# Patient Record
Sex: FEMALE | Race: WHITE | Employment: UNEMPLOYED | URBAN - METROPOLITAN AREA
[De-identification: names, ages, dates, MRNs, and addresses within clinical notes are randomized per-mention and may not be internally consistent; named-entity substitution may affect disease eponyms.]

---

## 2017-01-05 ENCOUNTER — OFFICE VISIT (OUTPATIENT)
Dept: INTERNAL MEDICINE CLINIC | Facility: CLINIC | Age: 27
End: 2017-01-05

## 2017-01-05 VITALS
BODY MASS INDEX: 22.02 KG/M2 | HEART RATE: 81 BPM | HEIGHT: 64 IN | OXYGEN SATURATION: 98 % | TEMPERATURE: 98 F | DIASTOLIC BLOOD PRESSURE: 68 MMHG | RESPIRATION RATE: 16 BRPM | WEIGHT: 129 LBS | SYSTOLIC BLOOD PRESSURE: 112 MMHG

## 2017-01-05 DIAGNOSIS — H66.93 BILATERAL OTITIS MEDIA, UNSPECIFIED CHRONICITY, UNSPECIFIED OTITIS MEDIA TYPE: ICD-10-CM

## 2017-01-05 DIAGNOSIS — J32.9 SINUSITIS, UNSPECIFIED CHRONICITY, UNSPECIFIED LOCATION: Primary | ICD-10-CM

## 2017-01-05 PROCEDURE — 99213 OFFICE O/P EST LOW 20 MIN: CPT | Performed by: INTERNAL MEDICINE

## 2017-01-05 RX ORDER — AMOXICILLIN AND CLAVULANATE POTASSIUM 875; 125 MG/1; MG/1
1 TABLET, FILM COATED ORAL 2 TIMES DAILY
Qty: 20 TABLET | Refills: 0 | Status: SHIPPED | OUTPATIENT
Start: 2017-01-05 | End: 2017-02-20

## 2017-01-05 RX ORDER — PSEUDOEPHEDRINE HYDROCHLORIDE 30 MG/1
30 TABLET ORAL EVERY 4 HOURS PRN
COMMUNITY
End: 2017-02-20

## 2017-01-05 RX ORDER — GUAIFENESIN AND PSEUDOEPHEDRINE HCL 1200; 120 MG/1; MG/1
1 TABLET, EXTENDED RELEASE ORAL 2 TIMES DAILY
Qty: 30 TABLET | Refills: 0 | Status: SHIPPED | OUTPATIENT
Start: 2017-01-05 | End: 2017-01-19

## 2017-01-05 NOTE — PROGRESS NOTES
Katherine Senior is a 24year old female  Patient presents with:   Other: Head congestion, Left ear pain, Ears feel clogged, Sinus pressure, Sinus headaches      HPI:   December 21 she developed sinus congestion, used tylenol sinus , got much better  T supple,no adenopathy,no bruits, no TM  LUNGS: clear to auscultation and percussion      ASSESSMENT AND PLAN:   Sinusitis, unspecified chronicity, unspecified location  (primary encounter diagnosis) start augmentin BID  Bilateral otitis media, unspecified c

## 2017-01-21 ENCOUNTER — LAB ENCOUNTER (OUTPATIENT)
Dept: LAB | Age: 27
End: 2017-01-21
Attending: OBSTETRICS & GYNECOLOGY
Payer: COMMERCIAL

## 2017-01-21 DIAGNOSIS — Z01.419 PAP SMEAR, LOW-RISK: Primary | ICD-10-CM

## 2017-01-21 PROCEDURE — 87624 HPV HI-RISK TYP POOLED RSLT: CPT

## 2017-01-21 PROCEDURE — 88175 CYTOPATH C/V AUTO FLUID REDO: CPT

## 2017-01-21 PROCEDURE — 87625 HPV TYPES 16 & 18 ONLY: CPT

## 2017-01-27 LAB — HPV I/H RISK 1 DNA SPEC QL NAA+PROBE: POSITIVE

## 2017-01-29 LAB
HPV16 DNA CVX QL PROBE+SIG AMP: NEGATIVE
HPV18 DNA CVX QL PROBE+SIG AMP: NEGATIVE

## 2017-02-20 ENCOUNTER — OFFICE VISIT (OUTPATIENT)
Dept: OBGYN CLINIC | Facility: CLINIC | Age: 27
End: 2017-02-20

## 2017-02-20 VITALS
TEMPERATURE: 98 F | HEIGHT: 64 IN | HEART RATE: 88 BPM | SYSTOLIC BLOOD PRESSURE: 114 MMHG | DIASTOLIC BLOOD PRESSURE: 60 MMHG | BODY MASS INDEX: 22.53 KG/M2 | WEIGHT: 132 LBS

## 2017-02-20 DIAGNOSIS — R87.810 CERVICAL HIGH RISK HUMAN PAPILLOMAVIRUS (HPV) DNA TEST POSITIVE: Primary | ICD-10-CM

## 2017-02-20 DIAGNOSIS — Z32.02 PREGNANCY EXAMINATION OR TEST, NEGATIVE RESULT: ICD-10-CM

## 2017-02-20 DIAGNOSIS — R87.610 PAPANICOLAOU SMEAR OF CERVIX WITH ATYPICAL SQUAMOUS CELLS OF UNDETERMINED SIGNIFICANCE (ASC-US): ICD-10-CM

## 2017-02-20 LAB — CONTROL LINE PRESENT WITH A CLEAR BACKGROUND (YES/NO): YES YES/NO

## 2017-02-20 PROCEDURE — 88305 TISSUE EXAM BY PATHOLOGIST: CPT | Performed by: OBSTETRICS & GYNECOLOGY

## 2017-02-20 PROCEDURE — 81025 URINE PREGNANCY TEST: CPT | Performed by: OBSTETRICS & GYNECOLOGY

## 2017-02-20 PROCEDURE — 57454 BX/CURETT OF CERVIX W/SCOPE: CPT | Performed by: OBSTETRICS & GYNECOLOGY

## 2017-02-20 NOTE — PROGRESS NOTES
Colpo w/Cx Biopsy and ECC    Pregnancy Results: negative from urine test     Consent signed. Procedure discussed with patient in detail including indication, risk, benefits, alternatives and complications.     Pre-Procedure:  Pre-Meds:    Cervix prepped wi

## 2017-06-07 ENCOUNTER — TELEPHONE (OUTPATIENT)
Dept: INTERNAL MEDICINE CLINIC | Facility: CLINIC | Age: 27
End: 2017-06-07

## 2017-06-07 NOTE — TELEPHONE ENCOUNTER
Patient needs a physical and Tb test for a job starting 7/5/17. She had a physical on 8/19/16 so it has not been a year. LMOM to call [employer] to find out the coverage for physicals (annual vs calendar year). If annual, than schedule form completion appt.

## 2017-06-08 ENCOUNTER — TELEPHONE (OUTPATIENT)
Dept: INTERNAL MEDICINE CLINIC | Facility: CLINIC | Age: 27
End: 2017-06-08

## 2017-06-09 ENCOUNTER — OFFICE VISIT (OUTPATIENT)
Dept: INTERNAL MEDICINE CLINIC | Facility: CLINIC | Age: 27
End: 2017-06-09

## 2017-06-09 VITALS
TEMPERATURE: 98 F | WEIGHT: 123 LBS | SYSTOLIC BLOOD PRESSURE: 90 MMHG | DIASTOLIC BLOOD PRESSURE: 60 MMHG | HEART RATE: 80 BPM | BODY MASS INDEX: 20.74 KG/M2 | RESPIRATION RATE: 16 BRPM | HEIGHT: 64.5 IN

## 2017-06-09 DIAGNOSIS — Z11.1 SCREENING EXAMINATION FOR PULMONARY TUBERCULOSIS: ICD-10-CM

## 2017-06-09 DIAGNOSIS — Z00.00 ANNUAL PHYSICAL EXAM: Primary | ICD-10-CM

## 2017-06-09 PROCEDURE — 99395 PREV VISIT EST AGE 18-39: CPT | Performed by: PHYSICIAN ASSISTANT

## 2017-06-09 PROCEDURE — 86580 TB INTRADERMAL TEST: CPT | Performed by: PHYSICIAN ASSISTANT

## 2017-06-09 NOTE — PROGRESS NOTES
North Houser is a 24year old female who presents for a complete physical exam.   HPI:   North Houser is a 24year old female who presents for a complete physical exam.    pt recently hired as new special  at "[employer]". heartburn, denies change in BM's or bloody stools  : denies vaginal discharge, urinary symptoms  MUSCULOSKELETAL: denies back pain  NEURO: denies headaches or dizziness  PSYCHE: denies depression or anxiety  HEMATOLOGIC: denies hx of anemia  ENDOCRINE: d Fabiano                                                                   Pathologist:           Yuriy Vo MD                                                           Specimens:   A) - Endocervix, Endocervix Curretting

## 2017-06-12 ENCOUNTER — NURSE ONLY (OUTPATIENT)
Dept: INTERNAL MEDICINE CLINIC | Facility: CLINIC | Age: 27
End: 2017-06-12

## 2017-06-12 NOTE — PROGRESS NOTES
Pt name and  verified. Pt here for tb read. Was placed 17 on left forearm within 48-72 hr timeframe. Negative reading, 0 mm. HPI:    Patient ID: China Graham is a 24year old female.     HPI    Review of Systems         Current Outpatient

## 2017-06-12 NOTE — PROGRESS NOTES
Pt name and  verified. Pt here for tb read, was placed 17 on left forearm within 48-72 hr time frame. Negative reading, 0mm. Paperwork completed and signed by Phil Castillo, given to pt. Tb result updated. Copy sent to scan.

## 2017-09-21 ENCOUNTER — OFFICE VISIT (OUTPATIENT)
Dept: INTERNAL MEDICINE CLINIC | Facility: CLINIC | Age: 27
End: 2017-09-21

## 2017-09-21 VITALS
BODY MASS INDEX: 21.08 KG/M2 | OXYGEN SATURATION: 98 % | WEIGHT: 125 LBS | HEIGHT: 64.5 IN | TEMPERATURE: 99 F | HEART RATE: 80 BPM | DIASTOLIC BLOOD PRESSURE: 70 MMHG | SYSTOLIC BLOOD PRESSURE: 104 MMHG

## 2017-09-21 DIAGNOSIS — H92.01 RIGHT EAR PAIN: Primary | ICD-10-CM

## 2017-09-21 DIAGNOSIS — J02.9 SORE THROAT: ICD-10-CM

## 2017-09-21 PROCEDURE — 99213 OFFICE O/P EST LOW 20 MIN: CPT | Performed by: NURSE PRACTITIONER

## 2017-09-21 RX ORDER — AMOXICILLIN AND CLAVULANATE POTASSIUM 875; 125 MG/1; MG/1
1 TABLET, FILM COATED ORAL 2 TIMES DAILY
Qty: 20 TABLET | Refills: 0 | Status: SHIPPED | OUTPATIENT
Start: 2017-09-21 | End: 2017-10-01

## 2017-09-21 NOTE — PATIENT INSTRUCTIONS
Gargle with warm salt water solution 3-5 times daily. Dissolve 1/2 teaspoon salt in half cup of warm tap water. Gargle and spit.      Try a premixed saline nasal spray, available over the counter, such as Ocean Nasal [place], 4 times d

## 2017-09-21 NOTE — PROGRESS NOTES
Patient presents with:  Ear Pain: right ear pain started yesterday       HPI:  Presents with 1 day history of right ear pain and sore throat on right side only. Stated had some sinus congestion and headaches a few days ago but this has resolved.  Denies fev adenopathy. Cardiovascular: Normal rate, regular rhythm. No murmur. Pulmonary/Chest: No respiratory distress. Effort normal. Breath sounds clear bilaterally. No wheezes, rhonchi or rales  Skin: Skin is warm and dry. No rash noted. No erythema.  No pallo

## 2018-01-22 ENCOUNTER — OFFICE VISIT (OUTPATIENT)
Dept: OBGYN CLINIC | Facility: CLINIC | Age: 28
End: 2018-01-22

## 2018-01-22 VITALS
WEIGHT: 129 LBS | HEIGHT: 64 IN | HEART RATE: 96 BPM | BODY MASS INDEX: 22.02 KG/M2 | SYSTOLIC BLOOD PRESSURE: 102 MMHG | DIASTOLIC BLOOD PRESSURE: 60 MMHG

## 2018-01-22 DIAGNOSIS — Z01.419 ENCOUNTER FOR WELL WOMAN EXAM WITH ROUTINE GYNECOLOGICAL EXAM: Primary | ICD-10-CM

## 2018-01-22 PROCEDURE — 99395 PREV VISIT EST AGE 18-39: CPT | Performed by: OBSTETRICS & GYNECOLOGY

## 2018-01-22 PROCEDURE — 87625 HPV TYPES 16 & 18 ONLY: CPT | Performed by: OBSTETRICS & GYNECOLOGY

## 2018-01-22 PROCEDURE — 87491 CHLMYD TRACH DNA AMP PROBE: CPT | Performed by: OBSTETRICS & GYNECOLOGY

## 2018-01-22 PROCEDURE — 87591 N.GONORRHOEAE DNA AMP PROB: CPT | Performed by: OBSTETRICS & GYNECOLOGY

## 2018-01-22 PROCEDURE — 87624 HPV HI-RISK TYP POOLED RSLT: CPT | Performed by: OBSTETRICS & GYNECOLOGY

## 2018-01-22 PROCEDURE — 88175 CYTOPATH C/V AUTO FLUID REDO: CPT | Performed by: OBSTETRICS & GYNECOLOGY

## 2018-01-22 RX ORDER — DESOGESTREL AND ETHINYL ESTRADIOL 0.15-0.03
1 KIT ORAL DAILY
Qty: 1 PACKAGE | Refills: 11 | Status: SHIPPED | OUTPATIENT
Start: 2018-01-22 | End: 2019-01-03

## 2018-01-22 NOTE — PROGRESS NOTES
Katherine Senior is a 34year old female No obstetric history on file. Patient's last menstrual period was 01/07/2018 (exact date). Patient presents with:  Wellness Visit  . patient had abnormal pap in 2015 ASCUS HPV + followed by normal colpo results Package, Rfl: 11  •  Multiple Vitamins-Calcium (TGT DAILY MULTIVITAMIN WOMENS) Oral Tab, Take 1 tablet by mouth daily. , Disp: , Rfl:     ALLERGIES:  No Known Allergies      Review of Systems:  Constitutional:  Denies fatigue, night sweats, hot flashes  Eye Assessment & Plan:  Diagnoses and all orders for this visit:    Encounter for well woman exam with routine gynecological exam  -     THINPREP PAP WITH HPV REFLEX REQUEST B; Future  -     CHLAMYDIA/GONOCOCCUS, AROLDO;  Future    Other orders  -     Desogest

## 2018-01-23 LAB
C TRACH DNA SPEC QL NAA+PROBE: NEGATIVE
N GONORRHOEA DNA SPEC QL NAA+PROBE: NEGATIVE

## 2018-01-25 LAB — HPV I/H RISK 1 DNA SPEC QL NAA+PROBE: POSITIVE

## 2018-01-28 LAB
HPV16 DNA CVX QL PROBE+SIG AMP: NEGATIVE
HPV18 DNA CVX QL PROBE+SIG AMP: NEGATIVE

## 2018-03-30 NOTE — TELEPHONE ENCOUNTER
12 hour chart check/ 2 RN skin check   MIKHAILI patient is coming in for a work physical tomorrow w/ [address], (per patient she called her insurance company and physical can be completed before the year.) Patient needs a TB test, not sure if it is 1 or 2 step.  Patient aware if ONE step she will need to

## 2018-05-23 ENCOUNTER — OFFICE VISIT (OUTPATIENT)
Dept: INTERNAL MEDICINE CLINIC | Facility: CLINIC | Age: 28
End: 2018-05-23

## 2018-05-23 VITALS
BODY MASS INDEX: 21.89 KG/M2 | TEMPERATURE: 98 F | HEIGHT: 64 IN | SYSTOLIC BLOOD PRESSURE: 106 MMHG | HEART RATE: 92 BPM | RESPIRATION RATE: 14 BRPM | WEIGHT: 128.19 LBS | OXYGEN SATURATION: 98 % | DIASTOLIC BLOOD PRESSURE: 58 MMHG

## 2018-05-23 DIAGNOSIS — R07.81 RIB PAIN ON RIGHT SIDE: ICD-10-CM

## 2018-05-23 DIAGNOSIS — J06.9 ACUTE URI: Primary | ICD-10-CM

## 2018-05-23 PROCEDURE — 87880 STREP A ASSAY W/OPTIC: CPT | Performed by: NURSE PRACTITIONER

## 2018-05-23 PROCEDURE — 99213 OFFICE O/P EST LOW 20 MIN: CPT | Performed by: NURSE PRACTITIONER

## 2018-05-23 RX ORDER — AMOXICILLIN AND CLAVULANATE POTASSIUM 875; 125 MG/1; MG/1
1 TABLET, FILM COATED ORAL 2 TIMES DAILY
Qty: 20 TABLET | Refills: 0 | Status: SHIPPED | OUTPATIENT
Start: 2018-05-23 | End: 2018-06-02

## 2018-05-23 NOTE — PROGRESS NOTES
Patient presents with:  Sore Throat  Body ache and/or chills  Nausea  Nasal Congestion  Cough      HPI:  Presents with approx 4 day history of sore throat, sinus congestion, nasal drainage, headaches, cough w/o production, body aches and some chills- state Location: Right arm, Patient Position: Sitting, Cuff Size: adult)   Pulse 92   Temp 98.2 °F (36.8 °C) (Oral)   Resp 14   Ht 64\"   Wt 128 lb 3.2 oz   LMP 04/29/2018 (Exact Date)   SpO2 98%   BMI 22.01 kg/m²   Constitutional: well developed, well nourished, and spit. Use a humidifier in your room at night.      I highly recommend using a saline nasal wash device such as: SinuCleanse Nasal wash squeeze bottle, Neti-Pot, Neilmed nasal wash or similar device 3 times daily (in the morning, after work and befor

## 2018-07-18 ENCOUNTER — TELEPHONE (OUTPATIENT)
Dept: INTERNAL MEDICINE CLINIC | Facility: CLINIC | Age: 28
End: 2018-07-18

## 2018-07-18 NOTE — TELEPHONE ENCOUNTER
The patient called inquiring about getting a prescription for a nasal spray that Dr. Sheila Cifuentes prescribed years ago. The patient could not remember the name, but started to spell fluticasone.  I asked the patient if it was Flonase (as I couldn't locate the nasa

## 2018-07-18 NOTE — TELEPHONE ENCOUNTER
Pt states that she had a nasal spray for her sinuses prescription from Dr Griffin Pump a few years ago, she is having sinus issues now and would like a refill of it. Does not remember what it was. To [place] [address]. Please advise.  Thank you

## 2018-07-27 ENCOUNTER — OFFICE VISIT (OUTPATIENT)
Dept: OBGYN CLINIC | Facility: CLINIC | Age: 28
End: 2018-07-27
Payer: COMMERCIAL

## 2018-07-27 VITALS
RESPIRATION RATE: 16 BRPM | SYSTOLIC BLOOD PRESSURE: 110 MMHG | WEIGHT: 125 LBS | BODY MASS INDEX: 21.34 KG/M2 | HEIGHT: 64 IN | HEART RATE: 88 BPM | DIASTOLIC BLOOD PRESSURE: 62 MMHG

## 2018-07-27 DIAGNOSIS — R87.610 ATYPICAL SQUAMOUS CELLS OF UNDETERMINED SIGNIFICANCE ON CYTOLOGIC SMEAR OF CERVIX (ASC-US): Primary | ICD-10-CM

## 2018-07-27 PROCEDURE — 88175 CYTOPATH C/V AUTO FLUID REDO: CPT | Performed by: OBSTETRICS & GYNECOLOGY

## 2018-07-27 PROCEDURE — 99212 OFFICE O/P EST SF 10 MIN: CPT | Performed by: OBSTETRICS & GYNECOLOGY

## 2018-07-27 NOTE — PROGRESS NOTES
North Houser is a 34year old female  Patient's last menstrual period was 2018 (approximate). Patient presents with:  Pap: 6 month pap.    .no complaints, if pap smear abnormal will proceed with colpo    OBSTETRICS HISTORY:  OB History Estradiol (RECLIPSEN) 0.15-30 MG-MCG Oral Tab, Take 1 tablet by mouth daily. , Disp: 1 Package, Rfl: 11  •  Multiple Vitamins-Calcium (TGT DAILY MULTIVITAMIN WOMENS) Oral Tab, Take 1 tablet by mouth daily. , Disp: , Rfl:     ALLERGIES:  No Known Allergies

## 2018-09-26 ENCOUNTER — TELEPHONE (OUTPATIENT)
Dept: INTERNAL MEDICINE CLINIC | Facility: CLINIC | Age: 28
End: 2018-09-26

## 2018-09-26 ENCOUNTER — HOSPITAL ENCOUNTER (OUTPATIENT)
Age: 28
Discharge: HOME | End: 2018-09-26
Attending: FAMILY MEDICINE
Payer: COMMERCIAL

## 2018-09-26 ENCOUNTER — APPOINTMENT (OUTPATIENT)
Dept: GENERAL RADIOLOGY | Age: 28
End: 2018-09-26
Attending: FAMILY MEDICINE
Payer: COMMERCIAL

## 2018-09-26 ENCOUNTER — APPOINTMENT (OUTPATIENT)
Dept: ULTRASOUND IMAGING | Age: 28
End: 2018-09-26
Attending: FAMILY MEDICINE
Payer: COMMERCIAL

## 2018-09-26 VITALS
OXYGEN SATURATION: 100 % | TEMPERATURE: 98 F | BODY MASS INDEX: 21.34 KG/M2 | HEART RATE: 78 BPM | RESPIRATION RATE: 18 BRPM | HEIGHT: 64 IN | WEIGHT: 125 LBS | SYSTOLIC BLOOD PRESSURE: 127 MMHG | DIASTOLIC BLOOD PRESSURE: 68 MMHG

## 2018-09-26 DIAGNOSIS — S33.5XXA LUMBAR SPRAIN, INITIAL ENCOUNTER: Primary | ICD-10-CM

## 2018-09-26 DIAGNOSIS — K80.20 CALCULUS OF GALLBLADDER WITHOUT CHOLECYSTITIS WITHOUT OBSTRUCTION: ICD-10-CM

## 2018-09-26 DIAGNOSIS — K59.00 CONSTIPATION, UNSPECIFIED CONSTIPATION TYPE: ICD-10-CM

## 2018-09-26 PROCEDURE — 85025 COMPLETE CBC W/AUTO DIFF WBC: CPT | Performed by: FAMILY MEDICINE

## 2018-09-26 PROCEDURE — 99214 OFFICE O/P EST MOD 30 MIN: CPT

## 2018-09-26 PROCEDURE — 80047 BASIC METABLC PNL IONIZED CA: CPT

## 2018-09-26 PROCEDURE — 81025 URINE PREGNANCY TEST: CPT | Performed by: FAMILY MEDICINE

## 2018-09-26 PROCEDURE — 74018 RADEX ABDOMEN 1 VIEW: CPT | Performed by: FAMILY MEDICINE

## 2018-09-26 PROCEDURE — 81002 URINALYSIS NONAUTO W/O SCOPE: CPT | Performed by: FAMILY MEDICINE

## 2018-09-26 PROCEDURE — 36415 COLL VENOUS BLD VENIPUNCTURE: CPT

## 2018-09-26 PROCEDURE — 99204 OFFICE O/P NEW MOD 45 MIN: CPT

## 2018-09-26 PROCEDURE — 83690 ASSAY OF LIPASE: CPT | Performed by: FAMILY MEDICINE

## 2018-09-26 PROCEDURE — 80053 COMPREHEN METABOLIC PANEL: CPT | Performed by: FAMILY MEDICINE

## 2018-09-26 PROCEDURE — 76700 US EXAM ABDOM COMPLETE: CPT | Performed by: FAMILY MEDICINE

## 2018-09-26 RX ORDER — CYCLOBENZAPRINE HCL 10 MG
10 TABLET ORAL 3 TIMES DAILY PRN
Qty: 10 TABLET | Refills: 0 | Status: SHIPPED | OUTPATIENT
Start: 2018-09-26 | End: 2018-10-03

## 2018-09-26 NOTE — TELEPHONE ENCOUNTER
Patient called to schedule appointment for back pain stated she believe it may be muscle or gallbladder. Appointment scheduled tmrw with . Please advise if patient needs to be seen sooner. Thank you!

## 2018-09-26 NOTE — TELEPHONE ENCOUNTER
Spoke to patient, advised to go to ED, cancelled appt with Dr. Kae Orozco tomorrow, will schedule a follow up appointment when necessary, patient expressed understanding.

## 2018-09-26 NOTE — TELEPHONE ENCOUNTER
Patient is calling in with back pain since Thursday, patient states it has gotten progressively worse all through her back, mostly mid back. Patient has history of  Gallstones.  Patient states over the weekend her right side was sore by gallbladder, \"feels

## 2018-09-26 NOTE — ED PROVIDER NOTES
Patient Seen in: [facility]    History   Patient presents with:  Back Pain (musculoskeletal)    Stated Complaint: back pain 6 days,gall stones    HPI    34year old female presents for low back pain and right upper abdominal nargis 78   Temp 98.4 °F (36.9 °C) (Temporal)   Resp 18   Ht 162.6 cm (5' 4\")   Wt 56.7 kg   LMP 09/16/2018   SpO2 100%   BMI 21.46 kg/m²         Physical Exam   Constitutional: She is oriented to person, place, and time.  She appears well-developed and well-nour Patient has no RUQ pain currently. Advised to increased PO fluids, high fiber diet and take Miralax as instructed. Advised to follow up with surgery if recurrent symptoms. Flexeril as prescribed. Cautioned about side effects.  Follow up with PCP if not bett

## 2018-09-26 NOTE — ED INITIAL ASSESSMENT (HPI)
C/O midback pain that started over the weekend that radiates to her upper back. Also c/o right lower back pain w/ numbness to her right arm and leg, sts that has since subsided. Has tried Tylenol E.S. and Aleve w/o much relief.  Sts that today she has pain

## 2018-09-27 ENCOUNTER — TELEPHONE (OUTPATIENT)
Dept: INTERNAL MEDICINE CLINIC | Facility: CLINIC | Age: 28
End: 2018-09-27

## 2018-09-27 NOTE — TELEPHONE ENCOUNTER
Patient was recently see at [facility] 09-26-18 calling to schedule UC follow up appointment. Please advise. Thank you!

## 2018-09-27 NOTE — TELEPHONE ENCOUNTER
Patient seen in UC yesterday 9/26/19 for back pain, right sided abdominal pain, history of gallstones. Patient dx musculoskeletal pain, prescribed muscle relaxer.  Patient states today she feels better, taking muscle relaxers, and alternating tylenol, ibupr

## 2018-10-08 ENCOUNTER — LAB ENCOUNTER (OUTPATIENT)
Dept: LAB | Age: 28
End: 2018-10-08
Attending: INTERNAL MEDICINE
Payer: COMMERCIAL

## 2018-10-08 ENCOUNTER — HOSPITAL ENCOUNTER (OUTPATIENT)
Dept: GENERAL RADIOLOGY | Age: 28
Discharge: HOME | End: 2018-10-08
Attending: INTERNAL MEDICINE
Payer: COMMERCIAL

## 2018-10-08 ENCOUNTER — OFFICE VISIT (OUTPATIENT)
Dept: INTERNAL MEDICINE CLINIC | Facility: CLINIC | Age: 28
End: 2018-10-08
Payer: COMMERCIAL

## 2018-10-08 VITALS
SYSTOLIC BLOOD PRESSURE: 128 MMHG | BODY MASS INDEX: 21.34 KG/M2 | RESPIRATION RATE: 14 BRPM | WEIGHT: 125 LBS | HEART RATE: 107 BPM | OXYGEN SATURATION: 98 % | DIASTOLIC BLOOD PRESSURE: 80 MMHG | HEIGHT: 64 IN

## 2018-10-08 DIAGNOSIS — K80.20 CALCULUS OF GALLBLADDER WITHOUT CHOLECYSTITIS WITHOUT OBSTRUCTION: ICD-10-CM

## 2018-10-08 DIAGNOSIS — M54.50 ACUTE BILATERAL LOW BACK PAIN WITHOUT SCIATICA: ICD-10-CM

## 2018-10-08 DIAGNOSIS — M54.6 ACUTE BILATERAL THORACIC BACK PAIN: ICD-10-CM

## 2018-10-08 DIAGNOSIS — M54.6 ACUTE BILATERAL THORACIC BACK PAIN: Primary | ICD-10-CM

## 2018-10-08 PROCEDURE — 87086 URINE CULTURE/COLONY COUNT: CPT | Performed by: INTERNAL MEDICINE

## 2018-10-08 PROCEDURE — 99214 OFFICE O/P EST MOD 30 MIN: CPT | Performed by: INTERNAL MEDICINE

## 2018-10-08 PROCEDURE — 71046 X-RAY EXAM CHEST 2 VIEWS: CPT | Performed by: INTERNAL MEDICINE

## 2018-10-08 PROCEDURE — 81003 URINALYSIS AUTO W/O SCOPE: CPT | Performed by: INTERNAL MEDICINE

## 2018-10-08 NOTE — PROGRESS NOTES
Zahraa Bueno is a 34year old female  Patient presents with:  Urgent Care F/u: Back Pain - Middle Area      HPI:   2 weeks LBP  Returning from work, developed upper neck pain going to shoulder blades then to middle back thennext day whole middle b GI: good BS's,no masses, HSM or tenderness, negative alicea  EXTREMITIES: no cyanosis, clubbing or edema  SPINE nontender, normal curvature,     Results for orders placed or performed during the hospital encounter of 69/46/80   COMP METABOLIC PANEL (14) 1.005, 1.010, 1.015, 1.020, 1.025, 1.030, >=1.030, <=1.005    Blood, Urine Trace-Intact (A) Negative    PH, Urine 7.5 5.0 - 8.0    Protein urine Negative Negative mg/dL    Urobilinogen urine 0.2 0.2 - 2.0 mg/dL    Nitrite Urine Negative Negative    Leukocy

## 2018-10-10 ENCOUNTER — TELEPHONE (OUTPATIENT)
Dept: INTERNAL MEDICINE CLINIC | Facility: CLINIC | Age: 28
End: 2018-10-10

## 2018-10-10 NOTE — TELEPHONE ENCOUNTER
Patient calling stated she recently had lab work done calling to go over the results. Please advise. Thank you!

## 2018-10-10 NOTE — TELEPHONE ENCOUNTER
Spoke with pt, she is looking for results from UC on 9/26/18. She was seen in office for UC f/u on 10/8/18 but states some labs were still outstanding. Wasn't sure if there was any change to treatment plan or anything worrisome.

## 2018-10-11 RX ORDER — CYCLOBENZAPRINE HCL 10 MG
10 TABLET ORAL NIGHTLY
Qty: 20 TABLET | Refills: 0 | Status: SHIPPED | OUTPATIENT
Start: 2018-10-11 | End: 2018-10-31

## 2018-10-11 NOTE — TELEPHONE ENCOUNTER
I am note sure what the patient is looking for. Does she need results? Does she want recommendations? LM for patient to call back to clarify.

## 2018-10-11 NOTE — TELEPHONE ENCOUNTER
Patient seen in  recently for back pain, rule out UTI. Spoke with patient about lab results, patient has a follow up with Dr. Phebe Brittle for back pain on 11/9/18, will be calling Dr. Herbert Maciel for an appointment.  In the meantime patient stating she would like a

## 2018-11-05 ENCOUNTER — OFFICE VISIT (OUTPATIENT)
Dept: SURGERY | Facility: CLINIC | Age: 28
End: 2018-11-05
Payer: COMMERCIAL

## 2018-11-05 VITALS
HEIGHT: 64 IN | TEMPERATURE: 98 F | DIASTOLIC BLOOD PRESSURE: 80 MMHG | BODY MASS INDEX: 21.34 KG/M2 | SYSTOLIC BLOOD PRESSURE: 120 MMHG | WEIGHT: 125 LBS | HEART RATE: 77 BPM

## 2018-11-05 DIAGNOSIS — K81.9 CHOLECYSTITIS: Primary | ICD-10-CM

## 2018-11-05 DIAGNOSIS — K80.20 CALCULUS OF GALLBLADDER WITHOUT CHOLECYSTITIS WITHOUT OBSTRUCTION: ICD-10-CM

## 2018-11-05 PROCEDURE — 99243 OFF/OP CNSLTJ NEW/EST LOW 30: CPT | Performed by: COLON & RECTAL SURGERY

## 2018-11-06 NOTE — H&P
New Patient Visit Note       Active Problems      1. Cholecystitis    2.  Calculus of gallbladder without cholecystitis without obstruction        Chief Complaint   Patient presents with:  Gallbladder: np ref by Dr. Angel Gonzalez for gallbladder consult, pt manuel December of this year.         Please provide approximate dates for:   Date   First symptoms or \"attack\" 9/20/18   Most recent \"attack\" n/a     Please answer the following:   Number   How many attacks have occurred? n/a   How many in the last month? n/a half of our visit was spent in counseling the patient on the above listed diagnoses and treatment options. Allergies  Tara has No Known Allergies.     Past Medical / Surgical / Social / Family History    The past medical and past surgical history hav Stress Concern: Not Asked        Weight Concern: Not Asked        Special Diet: Not Asked        Back Care: Not Asked        Exercise: Yes          2 days per week         Bike Helmet: Not Asked        Seat Belt: Yes        Self-Exams: Not Asked    Social diagnosis)  Calculus of gallbladder without cholecystitis without obstruction      Plan   This patient presented the referral of her primary care physician, Dr. Adair salazar.     The patient states that she had her first gallbladder attack in approximately bowel sounds present. He does have a bellybutton piercing in place. There are no other incisions on the abdomen. There are no hernias present. At today's office visit I had an extended conversation with the patient and her fiancé.   The patient did ha

## 2018-11-09 ENCOUNTER — OFFICE VISIT (OUTPATIENT)
Dept: INTERNAL MEDICINE CLINIC | Facility: CLINIC | Age: 28
End: 2018-11-09
Payer: COMMERCIAL

## 2018-11-09 VITALS
RESPIRATION RATE: 14 BRPM | DIASTOLIC BLOOD PRESSURE: 66 MMHG | WEIGHT: 126 LBS | OXYGEN SATURATION: 98 % | HEART RATE: 95 BPM | BODY MASS INDEX: 21.51 KG/M2 | SYSTOLIC BLOOD PRESSURE: 120 MMHG | TEMPERATURE: 98 F | HEIGHT: 64 IN

## 2018-11-09 DIAGNOSIS — R20.0 NUMBNESS AND TINGLING OF RIGHT ARM: ICD-10-CM

## 2018-11-09 DIAGNOSIS — R20.2 RIGHT LEG PARESTHESIAS: ICD-10-CM

## 2018-11-09 DIAGNOSIS — M54.6 ACUTE BILATERAL THORACIC BACK PAIN: ICD-10-CM

## 2018-11-09 DIAGNOSIS — K80.20 CALCULUS OF GALLBLADDER WITHOUT CHOLECYSTITIS WITHOUT OBSTRUCTION: Primary | ICD-10-CM

## 2018-11-09 DIAGNOSIS — R20.2 NUMBNESS AND TINGLING OF RIGHT ARM: ICD-10-CM

## 2018-11-09 PROCEDURE — 99214 OFFICE O/P EST MOD 30 MIN: CPT | Performed by: INTERNAL MEDICINE

## 2018-11-09 NOTE — PROGRESS NOTES
Zahraa Bueno is a 34year old female  Patient presents with: Follow - Up: for back pain, was referred to surgeon      HPI:   Had bilateral paraspinal thoracic pain for a month and it just started subsiding last week.  She also had persistent RUE a Cholelithiasis        REVIEW OF SYSTEMS:   GENERAL HEALTH: feels well otherwise      EXAM:   /66 (BP Location: Left arm, Patient Position: Sitting, Cuff Size: adult)   Pulse 95   Temp 98 °F (36.7 °C) (Oral)   Resp 14   Ht 64\"   Wt 126 lb   LMP 10/0 understanding of these issues and agrees to the plan.

## 2018-11-09 NOTE — PATIENT INSTRUCTIONS
This patient presented the referral of her primary care physician, Dr. Drew Sheldon. The patient states that she had her first gallbladder attack in approximately 2003. She had severe epigastric abdominal pain coupled with nausea and vomiting.   She presented on the abdomen. There are no hernias present. At today's office visit I had an extended conversation with the patient and her fiancé.   The patient did have at least 2 episodes of cholelithiasis which prompted further evaluation and treatment at an urge

## 2018-11-10 PROBLEM — K81.9 CHOLECYSTITIS: Status: ACTIVE | Noted: 2018-11-10

## 2018-11-13 ENCOUNTER — TELEPHONE (OUTPATIENT)
Dept: SURGERY | Facility: CLINIC | Age: 28
End: 2018-11-13

## 2018-11-13 DIAGNOSIS — K81.9 CHOLECYSTITIS: Primary | ICD-10-CM

## 2018-11-14 ENCOUNTER — TELEPHONE (OUTPATIENT)
Dept: SURGERY | Facility: CLINIC | Age: 28
End: 2018-11-14

## 2018-11-14 DIAGNOSIS — K81.9 CHOLECYSTITIS: Primary | ICD-10-CM

## 2019-01-02 ENCOUNTER — TELEPHONE (OUTPATIENT)
Dept: SURGERY | Facility: CLINIC | Age: 29
End: 2019-01-02

## 2019-01-03 RX ORDER — DESOGESTREL AND ETHINYL ESTRADIOL 0.15-0.03
KIT ORAL
Qty: 28 TABLET | Refills: 0 | Status: SHIPPED | OUTPATIENT
Start: 2019-01-03 | End: 2019-01-25

## 2019-01-07 ENCOUNTER — TELEPHONE (OUTPATIENT)
Dept: SURGERY | Facility: CLINIC | Age: 29
End: 2019-01-07

## 2019-01-07 DIAGNOSIS — K80.10 CALCULUS OF GALLBLADDER WITH CHRONIC CHOLECYSTITIS WITHOUT OBSTRUCTION: Primary | ICD-10-CM

## 2019-01-23 ENCOUNTER — TELEPHONE (OUTPATIENT)
Dept: SURGERY | Facility: CLINIC | Age: 29
End: 2019-01-23

## 2019-01-25 ENCOUNTER — OFFICE VISIT (OUTPATIENT)
Dept: OBGYN CLINIC | Facility: CLINIC | Age: 29
End: 2019-01-25
Payer: COMMERCIAL

## 2019-01-25 VITALS
RESPIRATION RATE: 16 BRPM | SYSTOLIC BLOOD PRESSURE: 118 MMHG | DIASTOLIC BLOOD PRESSURE: 84 MMHG | HEART RATE: 81 BPM | BODY MASS INDEX: 21.68 KG/M2 | WEIGHT: 127 LBS | HEIGHT: 64 IN

## 2019-01-25 DIAGNOSIS — Z01.419 ENCOUNTER FOR WELL WOMAN EXAM WITH ROUTINE GYNECOLOGICAL EXAM: Primary | ICD-10-CM

## 2019-01-25 DIAGNOSIS — Z12.4 CERVICAL CANCER SCREENING: ICD-10-CM

## 2019-01-25 PROCEDURE — 99395 PREV VISIT EST AGE 18-39: CPT | Performed by: OBSTETRICS & GYNECOLOGY

## 2019-01-25 PROCEDURE — 88175 CYTOPATH C/V AUTO FLUID REDO: CPT | Performed by: OBSTETRICS & GYNECOLOGY

## 2019-01-25 PROCEDURE — 87491 CHLMYD TRACH DNA AMP PROBE: CPT | Performed by: OBSTETRICS & GYNECOLOGY

## 2019-01-25 PROCEDURE — 87591 N.GONORRHOEAE DNA AMP PROB: CPT | Performed by: OBSTETRICS & GYNECOLOGY

## 2019-01-25 RX ORDER — DESOGESTREL AND ETHINYL ESTRADIOL 0.15-0.03
1 KIT ORAL
Qty: 28 TABLET | Refills: 11 | Status: SHIPPED | OUTPATIENT
Start: 2019-01-25 | End: 2019-07-05

## 2019-01-25 NOTE — PROGRESS NOTES
Kedar Espinoza is a 34year old female  Patient's last menstrual period was 2019 (exact date). Patient presents with:  Wellness Visit  .   Patient recently got , considering stopping OCP, desires STD testing  OBSTETRICS HISTORY:  OB Not Asked        Blood Transfusions: Not Asked        Caffeine Concern: No        Occupational Exposure: Not Asked        Hobby Hazards: Not Asked        Sleep Concern: Not Asked        Stress Concern: Not Asked        Weight Concern: Not Asked        Spec Constitutional: well developed, well nourished  Head/Face: normocephalic  Neck/Thyroid: thyroid symmetric, no thyromegaly, no nodules, no adenopathy  Lymphatic:no abnormal supraclavicular or axillary adenopathy is noted  Breast: normal without palpable m

## 2019-01-27 LAB
C TRACH DNA SPEC QL NAA+PROBE: NEGATIVE
N GONORRHOEA DNA SPEC QL NAA+PROBE: NEGATIVE

## 2019-01-30 ENCOUNTER — TELEPHONE (OUTPATIENT)
Dept: SURGERY | Facility: CLINIC | Age: 29
End: 2019-01-30

## 2019-01-30 DIAGNOSIS — K80.18 CALCULUS OF GALLBLADDER WITH OTHER CHOLECYSTITIS WITHOUT OBSTRUCTION: Primary | ICD-10-CM

## 2019-01-31 RX ORDER — HEPARIN SODIUM 5000 [USP'U]/ML
5000 INJECTION, SOLUTION INTRAVENOUS; SUBCUTANEOUS ONCE
Status: CANCELLED | OUTPATIENT
Start: 2019-01-31 | End: 2019-01-31

## 2019-02-05 ENCOUNTER — OFFICE VISIT (OUTPATIENT)
Dept: SURGERY | Facility: CLINIC | Age: 29
End: 2019-02-05
Payer: COMMERCIAL

## 2019-02-05 ENCOUNTER — APPOINTMENT (OUTPATIENT)
Dept: LAB | Facility: HOSPITAL | Age: 29
End: 2019-02-05
Payer: COMMERCIAL

## 2019-02-05 VITALS
RESPIRATION RATE: 18 BRPM | HEART RATE: 74 BPM | TEMPERATURE: 98 F | SYSTOLIC BLOOD PRESSURE: 124 MMHG | WEIGHT: 127 LBS | DIASTOLIC BLOOD PRESSURE: 84 MMHG | BODY MASS INDEX: 21.42 KG/M2 | HEIGHT: 64.5 IN

## 2019-02-05 DIAGNOSIS — K80.20 CALCULUS OF GALLBLADDER WITHOUT CHOLECYSTITIS WITHOUT OBSTRUCTION: Primary | ICD-10-CM

## 2019-02-05 DIAGNOSIS — K81.9 CHOLECYSTITIS: ICD-10-CM

## 2019-02-05 LAB
ALBUMIN SERPL-MCNC: 4.3 G/DL (ref 3.1–4.5)
ALBUMIN/GLOB SERPL: 1.2 {RATIO} (ref 1–2)
ALP LIVER SERPL-CCNC: 47 U/L (ref 37–98)
ALT SERPL-CCNC: 19 U/L (ref 14–54)
ANION GAP SERPL CALC-SCNC: 8 MMOL/L (ref 0–18)
AST SERPL-CCNC: 15 U/L (ref 15–41)
BILIRUB SERPL-MCNC: 0.4 MG/DL (ref 0.1–2)
BUN BLD-MCNC: 10 MG/DL (ref 8–20)
BUN/CREAT SERPL: 18.2 (ref 10–20)
CALCIUM BLD-MCNC: 9 MG/DL (ref 8.3–10.3)
CHLORIDE SERPL-SCNC: 105 MMOL/L (ref 101–111)
CO2 SERPL-SCNC: 26 MMOL/L (ref 22–32)
CREAT BLD-MCNC: 0.55 MG/DL (ref 0.55–1.02)
GLOBULIN PLAS-MCNC: 3.6 G/DL (ref 2.8–4.4)
GLUCOSE BLD-MCNC: 87 MG/DL (ref 70–99)
M PROTEIN MFR SERPL ELPH: 7.9 G/DL (ref 6.4–8.2)
OSMOLALITY SERPL CALC.SUM OF ELEC: 286 MOSM/KG (ref 275–295)
POTASSIUM SERPL-SCNC: 3.9 MMOL/L (ref 3.6–5.1)
SODIUM SERPL-SCNC: 139 MMOL/L (ref 136–144)

## 2019-02-05 PROCEDURE — 36415 COLL VENOUS BLD VENIPUNCTURE: CPT

## 2019-02-05 PROCEDURE — 99213 OFFICE O/P EST LOW 20 MIN: CPT | Performed by: SURGERY

## 2019-02-05 PROCEDURE — 80053 COMPREHEN METABOLIC PANEL: CPT

## 2019-02-05 RX ORDER — MV-MN/C/GLUTAMIN/LYSIN/HERB124 250-1.25MG
TABLET,CHEWABLE ORAL
COMMUNITY
End: 2019-08-01

## 2019-02-05 NOTE — PROGRESS NOTES
Follow Up Visit Note       Active Problems      1.  Calculus of gallbladder without cholecystitis without obstruction          Chief Complaint   Patient presents with:  Pre-Op: Pre-op Lap Ladonna scheduled 2/13 (DJP PT)        History of Present Illness    Pt been reviewed by me today.     Family History   Problem Relation Age of Onset   • Cancer Mother 36        Breast    • Hypertension Father    • Other (Kidney disease) Father      Social History    Socioeconomic History      Marital status:       Spous Gastrointestinal: Negative for abdominal distention, abdominal pain, anal bleeding, blood in stool, constipation, diarrhea, nausea and vomiting. Genitourinary: Negative for difficulty urinating, dysuria, frequency and urgency.    Musculoskeletal: Thressa Pheasant explained to the patient and the family. No orders of the defined types were placed in this encounter. Imaging & Referrals   None    Follow Up  No Follow-up on file.     Georgi Benavides MD

## 2019-02-13 ENCOUNTER — APPOINTMENT (OUTPATIENT)
Dept: GENERAL RADIOLOGY | Facility: HOSPITAL | Age: 29
End: 2019-02-13
Attending: SURGERY
Payer: COMMERCIAL

## 2019-02-13 ENCOUNTER — ANESTHESIA (OUTPATIENT)
Dept: SURGERY | Facility: HOSPITAL | Age: 29
End: 2019-02-13

## 2019-02-13 ENCOUNTER — HOSPITAL ENCOUNTER (OUTPATIENT)
Facility: HOSPITAL | Age: 29
Setting detail: HOSPITAL OUTPATIENT SURGERY
Discharge: HOME | End: 2019-02-13
Attending: SURGERY | Admitting: SURGERY
Payer: COMMERCIAL

## 2019-02-13 ENCOUNTER — ANESTHESIA EVENT (OUTPATIENT)
Dept: SURGERY | Facility: HOSPITAL | Age: 29
End: 2019-02-13

## 2019-02-13 VITALS
HEIGHT: 64 IN | SYSTOLIC BLOOD PRESSURE: 120 MMHG | WEIGHT: 123.44 LBS | DIASTOLIC BLOOD PRESSURE: 70 MMHG | BODY MASS INDEX: 21.07 KG/M2 | HEART RATE: 84 BPM | RESPIRATION RATE: 18 BRPM | TEMPERATURE: 99 F | OXYGEN SATURATION: 100 %

## 2019-02-13 DIAGNOSIS — K81.9 CHOLECYSTITIS: Primary | ICD-10-CM

## 2019-02-13 LAB
POCT LOT NUMBER: NORMAL
POCT URINE PREGNANCY: NEGATIVE

## 2019-02-13 PROCEDURE — 47563 LAPARO CHOLECYSTECTOMY/GRAPH: CPT | Performed by: SURGERY

## 2019-02-13 PROCEDURE — 74300 X-RAY BILE DUCTS/PANCREAS: CPT | Performed by: SURGERY

## 2019-02-13 PROCEDURE — BF031ZZ PLAIN RADIOGRAPHY OF GALLBLADDER AND BILE DUCTS USING LOW OSMOLAR CONTRAST: ICD-10-PCS | Performed by: SURGERY

## 2019-02-13 PROCEDURE — 0FT44ZZ RESECTION OF GALLBLADDER, PERCUTANEOUS ENDOSCOPIC APPROACH: ICD-10-PCS | Performed by: SURGERY

## 2019-02-13 RX ORDER — SODIUM CHLORIDE, SODIUM LACTATE, POTASSIUM CHLORIDE, CALCIUM CHLORIDE 600; 310; 30; 20 MG/100ML; MG/100ML; MG/100ML; MG/100ML
INJECTION, SOLUTION INTRAVENOUS CONTINUOUS
Status: DISCONTINUED | OUTPATIENT
Start: 2019-02-13 | End: 2019-02-13

## 2019-02-13 RX ORDER — HYDROCODONE BITARTRATE AND ACETAMINOPHEN 5; 325 MG/1; MG/1
1 TABLET ORAL EVERY 4 HOURS PRN
Qty: 20 TABLET | Refills: 0 | Status: SHIPPED | OUTPATIENT
Start: 2019-02-13 | End: 2019-02-23

## 2019-02-13 RX ORDER — BUPIVACAINE HYDROCHLORIDE AND EPINEPHRINE 5; 5 MG/ML; UG/ML
INJECTION, SOLUTION EPIDURAL; INTRACAUDAL; PERINEURAL AS NEEDED
Status: DISCONTINUED | OUTPATIENT
Start: 2019-02-13 | End: 2019-02-13 | Stop reason: HOSPADM

## 2019-02-13 RX ORDER — HYDROMORPHONE HYDROCHLORIDE 1 MG/ML
INJECTION, SOLUTION INTRAMUSCULAR; INTRAVENOUS; SUBCUTANEOUS
Status: COMPLETED
Start: 2019-02-13 | End: 2019-02-13

## 2019-02-13 RX ORDER — SCOLOPAMINE TRANSDERMAL SYSTEM 1 MG/1
1 PATCH, EXTENDED RELEASE TRANSDERMAL
Status: DISCONTINUED | OUTPATIENT
Start: 2019-02-13 | End: 2019-02-13

## 2019-02-13 RX ORDER — HYDROCODONE BITARTRATE AND ACETAMINOPHEN 5; 325 MG/1; MG/1
1 TABLET ORAL AS NEEDED
Status: COMPLETED | OUTPATIENT
Start: 2019-02-13 | End: 2019-02-13

## 2019-02-13 RX ORDER — ACETAMINOPHEN 500 MG
1000 TABLET ORAL ONCE
Status: DISCONTINUED | OUTPATIENT
Start: 2019-02-13 | End: 2019-02-13 | Stop reason: HOSPADM

## 2019-02-13 RX ORDER — SCOLOPAMINE TRANSDERMAL SYSTEM 1 MG/1
PATCH, EXTENDED RELEASE TRANSDERMAL
Status: DISCONTINUED
Start: 2019-02-13 | End: 2019-02-13

## 2019-02-13 RX ORDER — HYDROMORPHONE HYDROCHLORIDE 1 MG/ML
0.4 INJECTION, SOLUTION INTRAMUSCULAR; INTRAVENOUS; SUBCUTANEOUS EVERY 5 MIN PRN
Status: DISCONTINUED | OUTPATIENT
Start: 2019-02-13 | End: 2019-02-13

## 2019-02-13 RX ORDER — METOCLOPRAMIDE HYDROCHLORIDE 5 MG/ML
10 INJECTION INTRAMUSCULAR; INTRAVENOUS AS NEEDED
Status: DISCONTINUED | OUTPATIENT
Start: 2019-02-13 | End: 2019-02-13

## 2019-02-13 RX ORDER — DIPHENHYDRAMINE HYDROCHLORIDE 50 MG/ML
12.5 INJECTION INTRAMUSCULAR; INTRAVENOUS AS NEEDED
Status: DISCONTINUED | OUTPATIENT
Start: 2019-02-13 | End: 2019-02-13

## 2019-02-13 RX ORDER — HYDROCODONE BITARTRATE AND ACETAMINOPHEN 5; 325 MG/1; MG/1
2 TABLET ORAL AS NEEDED
Status: COMPLETED | OUTPATIENT
Start: 2019-02-13 | End: 2019-02-13

## 2019-02-13 RX ORDER — HEPARIN SODIUM 5000 [USP'U]/ML
5000 INJECTION, SOLUTION INTRAVENOUS; SUBCUTANEOUS ONCE
Status: COMPLETED | OUTPATIENT
Start: 2019-02-13 | End: 2019-02-13

## 2019-02-13 RX ORDER — NALOXONE HYDROCHLORIDE 0.4 MG/ML
80 INJECTION, SOLUTION INTRAMUSCULAR; INTRAVENOUS; SUBCUTANEOUS AS NEEDED
Status: DISCONTINUED | OUTPATIENT
Start: 2019-02-13 | End: 2019-02-13

## 2019-02-13 RX ORDER — ONDANSETRON 2 MG/ML
4 INJECTION INTRAMUSCULAR; INTRAVENOUS AS NEEDED
Status: DISCONTINUED | OUTPATIENT
Start: 2019-02-13 | End: 2019-02-13

## 2019-02-13 RX ORDER — LABETALOL HYDROCHLORIDE 5 MG/ML
5 INJECTION, SOLUTION INTRAVENOUS EVERY 5 MIN PRN
Status: DISCONTINUED | OUTPATIENT
Start: 2019-02-13 | End: 2019-02-13

## 2019-02-13 NOTE — H&P
History of Present Illness     Pt here to discuss her scheduled lap cj on 2/13/19. Pt avoiding greasy/fatty foods. US showed gallstones.     FINDINGS:    LIVER:  Normal size and echogenicity. No significant masses.   BILIARY:  1.4 cm gallstone.  No wal History    Socioeconomic History      Marital status:       Spouse name: Not on file      Number of children: Not on file      Years of education: Not on file      Highest education level: Not on file    Tobacco Use      Smoking status: Never Smoker urinating, dysuria, frequency and urgency. Musculoskeletal: Negative for arthralgias and myalgias. Skin: Negative for color change and rash. Neurological: Negative for tremors, syncope and weakness. Hematological: Negative for adenopathy.  Does not of the patient achieving goals; and potential problems that might occur during recuperation.   I discussed reasonable alternatives to the procedure, including risks, benefits and side effects related to the alternatives and risks related to not receiving th

## 2019-02-13 NOTE — ANESTHESIA PREPROCEDURE EVALUATION
PRE-OP EVALUATION    Patient Name: Phuong Spaulding    Pre-op Diagnosis: Cholecystitis [K81.9]    Procedure(s):  LAPAROSCOPIC CHOLECYSTECTOMY WITH CHOLANGIOGRAM    Surgeon(s) and Role:     Conrado Ford MD - Primary    Pre-op vitals reviewed.   Temp: mouth once daily. Disp: 28 tablet Rfl: 11   Cholecalciferol (VITAMIN D3 OR) Take 1 capsule by mouth daily. Disp:  Rfl:    Omega-3 Fatty Acids (FISH OIL CONCENTRATE OR) Take 2 capsules by mouth daily.    Disp:  Rfl:    Ascorbic Acid (VITAMIN C OR) Take 1 t bilaterally. (-) wheezes       Other findings            ASA: 1   Plan: general  NPO status verified and patient meets guidelines. Patient has not taken beta blockers in last 24 hours.         Plan/risks discussed with: patient                Presen

## 2019-02-13 NOTE — OPERATIVE REPORT
PREOPERATIVE DIAGNOSIS: Cholelithiasis. POSTOPERATIVE DIAGNOSIS: Cholelithiasis.      PROCEDURE PERFORMED: Laparoscopic cholecystectomy with intraoperative cholangiogram.     SURGEON: Brittney Vazquez MD       ASSISTANT: SAMMY Jimenez into the duodenum, common duct, and hepatic radicles. Then, 2 clips were then placed on the proximal aspect of the duct and the duct was transected with scissors. In a similar fashion, the cystic artery was identified, clipped and divided.  The gallbladder

## 2019-02-13 NOTE — ANESTHESIA POSTPROCEDURE EVALUATION
Aileen Grant 468 Patient Status:  Hospital Outpatient Surgery   Age/Gender 34year old female MRN [id number]   [facility] Attending Papa Miles MD   [facility] Day # 0 PCP Andrew Arvizu MD       Anesthesia

## 2019-02-22 ENCOUNTER — OFFICE VISIT (OUTPATIENT)
Dept: SURGERY | Facility: CLINIC | Age: 29
End: 2019-02-22

## 2019-02-22 VITALS — TEMPERATURE: 98 F | DIASTOLIC BLOOD PRESSURE: 79 MMHG | HEART RATE: 106 BPM | SYSTOLIC BLOOD PRESSURE: 125 MMHG

## 2019-02-22 DIAGNOSIS — K80.20 CALCULUS OF GALLBLADDER WITHOUT CHOLECYSTITIS WITHOUT OBSTRUCTION: Primary | ICD-10-CM

## 2019-02-22 PROCEDURE — 99024 POSTOP FOLLOW-UP VISIT: CPT | Performed by: SURGERY

## 2019-02-22 NOTE — PROGRESS NOTES
Post Operative Visit Note       Active Problems  No diagnosis found. Chief Complaint   Patient presents with:  Post-Op: p/o lap cj on 2/13         History of Present Illness         Allergies  Luz Porch is allergic to dairy products and latex.     Past Rfl: 0  •  Multiple Vitamins-Minerals ([facility] IMMUNITY SUPPORT) Oral Chew Tab, Chew by mouth., Disp: , Rfl:   •  Homeopathic Products (LIVER SUPPORT ), Place under the tongue., Disp: , Rfl:   •  Desogestrel-Ethinyl Estradiol (RECLIPSEN) 0.15-30 MG Up  No Follow-up on file.     Felicitas Patel MD

## 2019-02-22 NOTE — PROGRESS NOTES
Post Operative Visit Note       Active Problems  1.  Calculus of gallbladder without cholecystitis without obstruction         Chief Complaint   Patient presents with:  Post-Op: p/o lap cj on 2/13 -discomfort with movement in the incision area, random al Smoker      Smokeless tobacco: Never Used    Substance and Sexual Activity      Alcohol use:  Yes        Alcohol/week: 0.0 oz        Comment: Rarely      Drug use: No      Sexual activity: Yes        Birth control/protection: OCP    Other Topics      Concer for color change and rash. Neurological: Negative for tremors, syncope and weakness. Hematological: Negative for adenopathy. Does not bruise/bleed easily. Psychiatric/Behavioral: Negative for behavioral problems and sleep disturbance.        Physical

## 2019-03-01 ENCOUNTER — OFFICE VISIT (OUTPATIENT)
Dept: SURGERY | Facility: CLINIC | Age: 29
End: 2019-03-01

## 2019-03-01 VITALS
HEIGHT: 64.5 IN | SYSTOLIC BLOOD PRESSURE: 121 MMHG | DIASTOLIC BLOOD PRESSURE: 79 MMHG | RESPIRATION RATE: 16 BRPM | WEIGHT: 127 LBS | HEART RATE: 86 BPM | BODY MASS INDEX: 21.42 KG/M2 | TEMPERATURE: 98 F

## 2019-03-01 DIAGNOSIS — K80.20 CALCULUS OF GALLBLADDER WITHOUT CHOLECYSTITIS WITHOUT OBSTRUCTION: Primary | ICD-10-CM

## 2019-03-01 PROCEDURE — 99024 POSTOP FOLLOW-UP VISIT: CPT | Performed by: SURGERY

## 2019-03-01 NOTE — PROGRESS NOTES
Post Operative Visit Note       Active Problems  1. Calculus of gallbladder without cholecystitis without obstruction         Chief Complaint   Patient presents with:  Post-Op: 2nd p/o lap cj.  PT states that she will get horrible ABD PN, hot/sweaty and Yes        Alcohol/week: 0.0 oz        Comment: Rarely      Drug use: No      Sexual activity: Yes        Birth control/protection: OCP    Other Topics      Concerns:        Caffeine Concern: No        Exercise: No        Seat Belt: Yes       Current Outpa sleep disturbance. Physical Findings   /79   Pulse 86   Temp 97.8 °F (36.6 °C) (Oral)   Resp 16   Ht 64.5\"   Wt 127 lb   BMI 21.46 kg/m²   Physical Exam   Abdominal:   Wounds healing well and without erythema or drainage.    Nursing note and vi

## 2019-03-07 ENCOUNTER — TELEPHONE (OUTPATIENT)
Dept: SURGERY | Facility: CLINIC | Age: 29
End: 2019-03-07

## 2019-03-07 ENCOUNTER — HOSPITAL ENCOUNTER (OUTPATIENT)
Dept: NUCLEAR MEDICINE | Facility: HOSPITAL | Age: 29
Discharge: HOME | End: 2019-03-07
Attending: PHYSICIAN ASSISTANT
Payer: COMMERCIAL

## 2019-03-07 ENCOUNTER — OFFICE VISIT (OUTPATIENT)
Dept: SURGERY | Facility: CLINIC | Age: 29
End: 2019-03-07

## 2019-03-07 ENCOUNTER — APPOINTMENT (OUTPATIENT)
Dept: LAB | Facility: HOSPITAL | Age: 29
End: 2019-03-07
Attending: PHYSICIAN ASSISTANT
Payer: COMMERCIAL

## 2019-03-07 VITALS
DIASTOLIC BLOOD PRESSURE: 73 MMHG | BODY MASS INDEX: 21.08 KG/M2 | TEMPERATURE: 99 F | RESPIRATION RATE: 16 BRPM | WEIGHT: 125 LBS | HEIGHT: 64.5 IN | HEART RATE: 103 BPM | SYSTOLIC BLOOD PRESSURE: 116 MMHG

## 2019-03-07 DIAGNOSIS — R00.0 TACHYCARDIA: ICD-10-CM

## 2019-03-07 DIAGNOSIS — Z90.49 S/P LAPAROSCOPIC CHOLECYSTECTOMY: ICD-10-CM

## 2019-03-07 DIAGNOSIS — R10.11 RIGHT UPPER QUADRANT PAIN: ICD-10-CM

## 2019-03-07 DIAGNOSIS — R10.11 RIGHT UPPER QUADRANT PAIN: Primary | ICD-10-CM

## 2019-03-07 LAB
ALBUMIN SERPL-MCNC: 4.1 G/DL (ref 3.4–5)
ALBUMIN/GLOB SERPL: 1.2 {RATIO} (ref 1–2)
ALP LIVER SERPL-CCNC: 48 U/L (ref 37–98)
ALT SERPL-CCNC: 26 U/L (ref 13–56)
ANION GAP SERPL CALC-SCNC: 6 MMOL/L (ref 0–18)
AST SERPL-CCNC: 10 U/L (ref 15–37)
BILIRUB SERPL-MCNC: 0.4 MG/DL (ref 0.1–2)
BUN BLD-MCNC: 14 MG/DL (ref 7–18)
BUN/CREAT SERPL: 23.3 (ref 10–20)
CALCIUM BLD-MCNC: 9 MG/DL (ref 8.5–10.1)
CHLORIDE SERPL-SCNC: 107 MMOL/L (ref 98–107)
CO2 SERPL-SCNC: 27 MMOL/L (ref 21–32)
CREAT BLD-MCNC: 0.6 MG/DL (ref 0.55–1.02)
DEPRECATED RDW RBC AUTO: 43.3 FL (ref 35.1–46.3)
ERYTHROCYTE [DISTWIDTH] IN BLOOD BY AUTOMATED COUNT: 12.5 % (ref 11–15)
GLOBULIN PLAS-MCNC: 3.3 G/DL (ref 2.8–4.4)
GLUCOSE BLD-MCNC: 141 MG/DL (ref 70–99)
HCT VFR BLD AUTO: 39.1 % (ref 35–48)
HGB BLD-MCNC: 13.4 G/DL (ref 12–16)
M PROTEIN MFR SERPL ELPH: 7.4 G/DL (ref 6.4–8.2)
MCH RBC QN AUTO: 32.1 PG (ref 26–34)
MCHC RBC AUTO-ENTMCNC: 34.3 G/DL (ref 31–37)
MCV RBC AUTO: 93.8 FL (ref 80–100)
OSMOLALITY SERPL CALC.SUM OF ELEC: 293 MOSM/KG (ref 275–295)
PLATELET # BLD AUTO: 289 10(3)UL (ref 150–450)
POTASSIUM SERPL-SCNC: 4.1 MMOL/L (ref 3.5–5.1)
RBC # BLD AUTO: 4.17 X10(6)UL (ref 3.8–5.3)
SODIUM SERPL-SCNC: 140 MMOL/L (ref 136–145)
WBC # BLD AUTO: 7.1 X10(3) UL (ref 4–11)

## 2019-03-07 PROCEDURE — 85027 COMPLETE CBC AUTOMATED: CPT

## 2019-03-07 PROCEDURE — 80053 COMPREHEN METABOLIC PANEL: CPT

## 2019-03-07 PROCEDURE — 99024 POSTOP FOLLOW-UP VISIT: CPT | Performed by: PHYSICIAN ASSISTANT

## 2019-03-07 PROCEDURE — 78299 UNLISTED GI PX DX NUC MED: CPT | Performed by: PHYSICIAN ASSISTANT

## 2019-03-07 PROCEDURE — 36415 COLL VENOUS BLD VENIPUNCTURE: CPT

## 2019-03-07 PROCEDURE — 78226 HEPATOBILIARY SYSTEM IMAGING: CPT | Performed by: PHYSICIAN ASSISTANT

## 2019-03-07 NOTE — PROGRESS NOTES
Post Operative Visit Note       Active Problems  1. Right upper quadrant pain    2. S/P laparoscopic cholecystectomy    3. Tachycardia         Chief Complaint   Patient presents with:  Post-Op: 3rd p/o lap cj on 2/13. c/p sharp ABD PN.  PT has RQ ABD PN COLPOSCOPY, CERVIX W/UPPER ADJACENT VAGINA; W/BIOPSY(S), CERVIX  01/27/2015    NEGATIVE   • LAPAROSCOPIC CHOLECYSTECTOMY     • LAPAROSCOPIC CHOLECYSTECTOMY N/A 2/13/2019    Performed by Lenore De La Fuente MD at [facility] MAIN OR   • [address]         The diaphoresis, fatigue, fever and unexpected weight change. HENT: Negative for hearing loss, nosebleeds, sore throat and trouble swallowing. Respiratory: Negative for apnea, cough, shortness of breath and wheezing.     Cardiovascular: Positive for palpit Psychiatric: She has a normal mood and affect. Her behavior is normal. Judgment and thought content normal.   Nursing note and vitals reviewed.         Assessment   Right upper quadrant pain  (primary encounter diagnosis)  S/P laparoscopic cholecystectomy

## 2019-03-07 NOTE — TELEPHONE ENCOUNTER
2/13 had lap cj with RRP, has had sharp abdominal pains on and off all night. Does not know if she has a fever, denies N & V or other issues.  Appt made

## 2019-03-08 ENCOUNTER — TELEPHONE (OUTPATIENT)
Dept: SURGERY | Facility: CLINIC | Age: 29
End: 2019-03-08

## 2019-03-08 NOTE — TELEPHONE ENCOUNTER
Stat HIDA scan is normal - no leak, informed pt and reviewed instructions given on office this afternoon for using pain med, ice/heat.  Will contact pt with blood work tomorrow, Pt V/U.

## 2019-03-08 NOTE — TELEPHONE ENCOUNTER
Sissy SETHI phoned our office. Return to work letter written for pt at her request, no restrictions. Pt will  at end of office today.

## 2019-06-13 ENCOUNTER — TELEPHONE (OUTPATIENT)
Dept: OBGYN CLINIC | Facility: CLINIC | Age: 29
End: 2019-06-13

## 2019-06-13 NOTE — TELEPHONE ENCOUNTER
Per pt she is in her early stage of her pregnancy, has her 1st ob visits on 07-05-19 w/dr KHAN. She has some questions about traveling. Please advise and call pt, she really would appreciate your call.  Thanks

## 2019-06-14 NOTE — TELEPHONE ENCOUNTER
Patient called with questions r/t traveling during pregnancy. She was planning a cruise and wanted to know if its safe to still travel. Patient was advised to check with [facility] web side which areas area safe to travel and not affected by [place].  Patient will [facility]

## 2019-07-05 ENCOUNTER — ULTRASOUND ENCOUNTER (OUTPATIENT)
Dept: OBGYN CLINIC | Facility: CLINIC | Age: 29
End: 2019-07-05
Payer: COMMERCIAL

## 2019-07-05 ENCOUNTER — INITIAL PRENATAL (OUTPATIENT)
Dept: OBGYN CLINIC | Facility: CLINIC | Age: 29
End: 2019-07-05
Payer: COMMERCIAL

## 2019-07-05 VITALS
DIASTOLIC BLOOD PRESSURE: 68 MMHG | HEIGHT: 64.5 IN | WEIGHT: 130 LBS | SYSTOLIC BLOOD PRESSURE: 110 MMHG | BODY MASS INDEX: 21.93 KG/M2

## 2019-07-05 DIAGNOSIS — Z12.4 PAPANICOLAOU SMEAR FOR CERVICAL CANCER SCREENING: ICD-10-CM

## 2019-07-05 DIAGNOSIS — O36.80X0 PREGNANCY WITH UNCERTAIN FETAL VIABILITY, SINGLE OR UNSPECIFIED FETUS: Primary | ICD-10-CM

## 2019-07-05 DIAGNOSIS — Z34.01 ENCOUNTER FOR SUPERVISION OF NORMAL FIRST PREGNANCY IN FIRST TRIMESTER: Primary | ICD-10-CM

## 2019-07-05 LAB
APPEARANCE: CLEAR
MULTISTIX LOT#: NORMAL NUMERIC
PH, URINE: 7 (ref 4.5–8)
SPECIFIC GRAVITY: 1.02 (ref 1–1.03)
URINE-COLOR: YELLOW
UROBILINOGEN,SEMI-QN: 0.2 MG/DL (ref 0–1.9)

## 2019-07-05 PROCEDURE — 76801 OB US < 14 WKS SINGLE FETUS: CPT | Performed by: OBSTETRICS & GYNECOLOGY

## 2019-07-05 PROCEDURE — 87491 CHLMYD TRACH DNA AMP PROBE: CPT | Performed by: OBSTETRICS & GYNECOLOGY

## 2019-07-05 PROCEDURE — 88175 CYTOPATH C/V AUTO FLUID REDO: CPT | Performed by: OBSTETRICS & GYNECOLOGY

## 2019-07-05 PROCEDURE — 87591 N.GONORRHOEAE DNA AMP PROB: CPT | Performed by: OBSTETRICS & GYNECOLOGY

## 2019-07-05 PROCEDURE — 87086 URINE CULTURE/COLONY COUNT: CPT | Performed by: OBSTETRICS & GYNECOLOGY

## 2019-07-05 PROCEDURE — 81002 URINALYSIS NONAUTO W/O SCOPE: CPT | Performed by: OBSTETRICS & GYNECOLOGY

## 2019-07-05 PROCEDURE — 76817 TRANSVAGINAL US OBSTETRIC: CPT | Performed by: OBSTETRICS & GYNECOLOGY

## 2019-07-05 NOTE — PROGRESS NOTES
transabdominal ultrasound performed  single viable iup at 9w1d  s=d  ys present  mof=774 bpm  bilateral normal ovaries he took 1 dose of Robitussin that is all she been taking during her pregnancy vitamins and diet were discussed.   Denies smoking denies al

## 2019-07-07 LAB
C TRACH DNA SPEC QL NAA+PROBE: NEGATIVE
N GONORRHOEA DNA SPEC QL NAA+PROBE: NEGATIVE

## 2019-07-10 ENCOUNTER — LAB ENCOUNTER (OUTPATIENT)
Dept: LAB | Age: 29
End: 2019-07-10
Attending: OBSTETRICS & GYNECOLOGY
Payer: COMMERCIAL

## 2019-07-10 DIAGNOSIS — Z34.01 ENCOUNTER FOR SUPERVISION OF NORMAL FIRST PREGNANCY IN FIRST TRIMESTER: ICD-10-CM

## 2019-07-10 LAB
ANTIBODY SCREEN: NEGATIVE
BASOPHILS # BLD AUTO: 0.04 X10(3) UL (ref 0–0.2)
BASOPHILS NFR BLD AUTO: 0.5 %
DEPRECATED RDW RBC AUTO: 42.5 FL (ref 35.1–46.3)
EOSINOPHIL # BLD AUTO: 0.06 X10(3) UL (ref 0–0.7)
EOSINOPHIL NFR BLD AUTO: 0.7 %
ERYTHROCYTE [DISTWIDTH] IN BLOOD BY AUTOMATED COUNT: 12.3 % (ref 11–15)
HBV SURFACE AG SER-ACNC: <0.1 [IU]/L
HBV SURFACE AG SERPL QL IA: NONREACTIVE
HCT VFR BLD AUTO: 41.8 % (ref 35–48)
HGB BLD-MCNC: 13.9 G/DL (ref 12–16)
IMM GRANULOCYTES # BLD AUTO: 0.07 X10(3) UL (ref 0–1)
IMM GRANULOCYTES NFR BLD: 0.8 %
LYMPHOCYTES # BLD AUTO: 2.16 X10(3) UL (ref 1–4)
LYMPHOCYTES NFR BLD AUTO: 25.3 %
MCH RBC QN AUTO: 31 PG (ref 26–34)
MCHC RBC AUTO-ENTMCNC: 33.3 G/DL (ref 31–37)
MCV RBC AUTO: 93.1 FL (ref 80–100)
MONOCYTES # BLD AUTO: 0.67 X10(3) UL (ref 0.1–1)
MONOCYTES NFR BLD AUTO: 7.9 %
NEUTROPHILS # BLD AUTO: 5.53 X10 (3) UL (ref 1.5–7.7)
NEUTROPHILS # BLD AUTO: 5.53 X10(3) UL (ref 1.5–7.7)
NEUTROPHILS NFR BLD AUTO: 64.8 %
PLATELET # BLD AUTO: 257 10(3)UL (ref 150–450)
RBC # BLD AUTO: 4.49 X10(6)UL (ref 3.8–5.3)
RH BLOOD TYPE: POSITIVE
RUBV IGG SER QL: POSITIVE
RUBV IGG SER-ACNC: >500 IU/ML (ref 10–?)
T PALLIDUM AB SER QL IA: NONREACTIVE
WBC # BLD AUTO: 8.5 X10(3) UL (ref 4–11)

## 2019-07-10 PROCEDURE — 87389 HIV-1 AG W/HIV-1&-2 AB AG IA: CPT | Performed by: OBSTETRICS & GYNECOLOGY

## 2019-07-10 PROCEDURE — 86901 BLOOD TYPING SEROLOGIC RH(D): CPT | Performed by: OBSTETRICS & GYNECOLOGY

## 2019-07-10 PROCEDURE — 86900 BLOOD TYPING SEROLOGIC ABO: CPT | Performed by: OBSTETRICS & GYNECOLOGY

## 2019-07-10 PROCEDURE — 86850 RBC ANTIBODY SCREEN: CPT | Performed by: OBSTETRICS & GYNECOLOGY

## 2019-07-10 PROCEDURE — 86762 RUBELLA ANTIBODY: CPT | Performed by: OBSTETRICS & GYNECOLOGY

## 2019-07-10 PROCEDURE — 86780 TREPONEMA PALLIDUM: CPT | Performed by: OBSTETRICS & GYNECOLOGY

## 2019-07-10 PROCEDURE — 85025 COMPLETE CBC W/AUTO DIFF WBC: CPT | Performed by: OBSTETRICS & GYNECOLOGY

## 2019-07-10 PROCEDURE — 87340 HEPATITIS B SURFACE AG IA: CPT | Performed by: OBSTETRICS & GYNECOLOGY

## 2019-08-01 ENCOUNTER — ROUTINE PRENATAL (OUTPATIENT)
Dept: OBGYN CLINIC | Facility: CLINIC | Age: 29
End: 2019-08-01
Payer: COMMERCIAL

## 2019-08-01 VITALS
DIASTOLIC BLOOD PRESSURE: 50 MMHG | BODY MASS INDEX: 21.86 KG/M2 | SYSTOLIC BLOOD PRESSURE: 102 MMHG | WEIGHT: 129.63 LBS | HEIGHT: 64.5 IN

## 2019-08-01 DIAGNOSIS — Z34.01 ENCOUNTER FOR SUPERVISION OF NORMAL FIRST PREGNANCY IN FIRST TRIMESTER: Primary | ICD-10-CM

## 2019-08-01 DIAGNOSIS — IMO0002 FETAL HEART TONES NOT HEARD: ICD-10-CM

## 2019-08-01 LAB — MULTISTIX LOT#: NORMAL NUMERIC

## 2019-08-01 PROCEDURE — 81002 URINALYSIS NONAUTO W/O SCOPE: CPT | Performed by: OBSTETRICS & GYNECOLOGY

## 2019-08-01 PROCEDURE — 76815 OB US LIMITED FETUS(S): CPT | Performed by: OBSTETRICS & GYNECOLOGY

## 2019-08-19 ENCOUNTER — TELEPHONE (OUTPATIENT)
Dept: OBGYN CLINIC | Facility: CLINIC | Age: 29
End: 2019-08-19

## 2019-08-19 NOTE — TELEPHONE ENCOUNTER
Patient is about 15 weeks pregnant and states she is going out of town to [place], patient said she spoke to the doctor about it but would like to know if there is any restrictions as far as Altitude that she should be aware of.  Please advise

## 2019-08-19 NOTE — TELEPHONE ENCOUNTER
Patient stated that she had already talked to doctor about traveling to [place] and she had said it is fine. Informed that  she may experience some feeling of shortness of breath at the higher altitude.  Patient informed that she will need to see how she

## 2019-08-28 ENCOUNTER — TELEPHONE (OUTPATIENT)
Dept: OBGYN CLINIC | Facility: CLINIC | Age: 29
End: 2019-08-28

## 2019-08-28 ENCOUNTER — ROUTINE PRENATAL (OUTPATIENT)
Dept: OBGYN CLINIC | Facility: CLINIC | Age: 29
End: 2019-08-28
Payer: COMMERCIAL

## 2019-08-28 VITALS
HEIGHT: 64.5 IN | BODY MASS INDEX: 22.09 KG/M2 | WEIGHT: 131 LBS | DIASTOLIC BLOOD PRESSURE: 60 MMHG | SYSTOLIC BLOOD PRESSURE: 110 MMHG

## 2019-08-28 DIAGNOSIS — R19.7 DIARRHEA DURING PREGNANCY: Primary | ICD-10-CM

## 2019-08-28 DIAGNOSIS — Z34.02 ENCOUNTER FOR SUPERVISION OF NORMAL FIRST PREGNANCY IN SECOND TRIMESTER: ICD-10-CM

## 2019-08-28 DIAGNOSIS — Z36.89 SCREENING, ANTENATAL, FOR FETAL ANATOMIC SURVEY: ICD-10-CM

## 2019-08-28 DIAGNOSIS — O26.899 DIARRHEA DURING PREGNANCY: Primary | ICD-10-CM

## 2019-08-28 PROBLEM — K81.9 CHOLECYSTITIS: Status: RESOLVED | Noted: 2018-11-10 | Resolved: 2019-08-28

## 2019-08-28 LAB — MULTISTIX LOT#: NORMAL NUMERIC

## 2019-08-28 PROCEDURE — 81002 URINALYSIS NONAUTO W/O SCOPE: CPT | Performed by: OBSTETRICS & GYNECOLOGY

## 2019-09-17 ENCOUNTER — TELEPHONE (OUTPATIENT)
Dept: OBGYN CLINIC | Facility: CLINIC | Age: 29
End: 2019-09-17

## 2019-09-17 NOTE — TELEPHONE ENCOUNTER
Patient informed that Dr Andres Hardy would not recommend having her cholesterol level drawn during pregnancy as it would be elevated. The result would not be a fair evaluation.  Patient informed that we will provide a letter for her and she will pick it up at

## 2019-09-17 NOTE — TELEPHONE ENCOUNTER
Patient is 19 weeks pregnant and would like to have her cholesterol drawn for a physical paper needed for her job.  Please advise

## 2019-09-27 ENCOUNTER — ROUTINE PRENATAL (OUTPATIENT)
Dept: OBGYN CLINIC | Facility: CLINIC | Age: 29
End: 2019-09-27
Payer: COMMERCIAL

## 2019-09-27 ENCOUNTER — ULTRASOUND ENCOUNTER (OUTPATIENT)
Dept: OBGYN CLINIC | Facility: CLINIC | Age: 29
End: 2019-09-27
Payer: COMMERCIAL

## 2019-09-27 VITALS
SYSTOLIC BLOOD PRESSURE: 102 MMHG | DIASTOLIC BLOOD PRESSURE: 52 MMHG | HEIGHT: 64.5 IN | WEIGHT: 136 LBS | BODY MASS INDEX: 22.94 KG/M2

## 2019-09-27 DIAGNOSIS — Z34.02 ENCOUNTER FOR SUPERVISION OF NORMAL FIRST PREGNANCY IN SECOND TRIMESTER: Primary | ICD-10-CM

## 2019-09-27 DIAGNOSIS — Z3A.20 20 WEEKS GESTATION OF PREGNANCY: ICD-10-CM

## 2019-09-27 DIAGNOSIS — Z36.89 SCREENING, ANTENATAL, FOR FETAL ANATOMIC SURVEY: ICD-10-CM

## 2019-09-27 DIAGNOSIS — O35.8XX0 PYELECTASIS OF FETUS ON PRENATAL ULTRASOUND: ICD-10-CM

## 2019-09-27 LAB — MULTISTIX LOT#: NORMAL NUMERIC

## 2019-09-27 PROCEDURE — 76805 OB US >/= 14 WKS SNGL FETUS: CPT | Performed by: OBSTETRICS & GYNECOLOGY

## 2019-09-27 PROCEDURE — 81002 URINALYSIS NONAUTO W/O SCOPE: CPT | Performed by: OBSTETRICS & GYNECOLOGY

## 2019-09-27 NOTE — PROGRESS NOTES
Patient has no complaints  - u/s    CEPHALIC  CVX= 3.5 CM  POSTERIOR PLACENTA  S=D  LOAN APPEARS NRMAL  SUBOPTIMAL STRUCTURES  BILATERAL LARGE KIDNEY PYELECTASIS MEASURING 1.0 CM ON BOTH KIDNEYS  F/U MFM RECOMMEMDED

## 2019-09-30 ENCOUNTER — OFFICE VISIT (OUTPATIENT)
Dept: PERINATAL CARE | Facility: HOSPITAL | Age: 29
End: 2019-09-30
Attending: OBSTETRICS & GYNECOLOGY
Payer: COMMERCIAL

## 2019-09-30 VITALS
HEART RATE: 87 BPM | SYSTOLIC BLOOD PRESSURE: 126 MMHG | HEIGHT: 65 IN | DIASTOLIC BLOOD PRESSURE: 87 MMHG | BODY MASS INDEX: 22.66 KG/M2 | WEIGHT: 136 LBS

## 2019-09-30 DIAGNOSIS — O35.8XX0 FETAL HYDRONEPHROSIS DURING PREGNANCY, ANTEPARTUM, SINGLE OR UNSPECIFIED FETUS: ICD-10-CM

## 2019-09-30 DIAGNOSIS — O35.8XX0 PREGNANCY COMPLICATED BY FETAL CONGENITAL HEART DISEASE, SINGLE OR UNSPECIFIED FETUS: ICD-10-CM

## 2019-09-30 PROBLEM — O35.EXX0 FETAL HYDRONEPHROSIS DURING PREGNANCY, ANTEPARTUM: Status: ACTIVE | Noted: 2019-09-30

## 2019-09-30 PROCEDURE — 99245 OFF/OP CONSLTJ NEW/EST HI 55: CPT | Performed by: OBSTETRICS & GYNECOLOGY

## 2019-09-30 PROCEDURE — 76817 TRANSVAGINAL US OBSTETRIC: CPT | Performed by: OBSTETRICS & GYNECOLOGY

## 2019-09-30 PROCEDURE — 76811 OB US DETAILED SNGL FETUS: CPT | Performed by: OBSTETRICS & GYNECOLOGY

## 2019-09-30 NOTE — PROGRESS NOTES
Indication: Pyelectasis. ____________________________________________________________________________  History: Age: 34 years.   ____________________________________________________________________________  Dating:  LMP: 05/04/2019 EDC: 02/08/2020 GA by abnormal. Right kidney: abnormal.   Hydronephrosis, both sides, left side: moderate dilatation, normal cortex, right side: moderate dilatation, normal cortex.     ____________________________________________________________________________  [facility]  AFV is normal.  The right side of the heart appears enlarged with a small pericardial effusion. The aortic arch does not appear normal and suspicious for coarctation. I cannot visualize the superior vena cava and 3 vessel view.       She understand dates     3.  bilateral fetal hydronephrosis-  possible partial bladder outlet obstruction  4.  suspicious for fetal coarctation of aorta    Recommendations:    1.  f/u growth US in 6wks    2.  scheduled with Peds Cardiology this week   3.  genetic testing

## 2019-09-30 NOTE — PROGRESS NOTES
Patient notified of ultrasound results and recommendations per Dr. Rosibel Johnson during office visit. Order for MFM consult placed.

## 2019-10-02 ENCOUNTER — OFFICE VISIT (OUTPATIENT)
Dept: PERINATAL CARE | Facility: HOSPITAL | Age: 29
End: 2019-10-02
Attending: PEDIATRICS
Payer: COMMERCIAL

## 2019-10-02 PROCEDURE — 76825 ECHO EXAM OF FETAL HEART: CPT

## 2019-10-02 PROCEDURE — 36415 COLL VENOUS BLD VENIPUNCTURE: CPT

## 2019-10-02 PROCEDURE — 93325 DOPPLER ECHO COLOR FLOW MAPG: CPT

## 2019-10-02 PROCEDURE — 76827 ECHO EXAM OF FETAL HEART: CPT

## 2019-10-02 NOTE — PROGRESS NOTES
Pt here for fetal echo with Dr Juliana Jaramillo ultrasound pt requests NIPT-per conversation with Dr Fidel Gilmore on 9/30/19  Melba Dominguez

## 2019-10-02 NOTE — PROGRESS NOTES
Mirna Ivan    Dear Dr. Díaz Plymouth :    I had the pleasure of seeing your patient Tomás Mccarty in the fetal echocardiography lab at [facility] on 10/02/2019.     She is a 33 yo  21w4d IUP with suspected fetal congenital heart disturbance. The aortic valve had  normal Doppler interrogations. There is a low suspicion for a subaortic VSD. The aortic arch was a left aortic arch. The aortic arch appeared to be normal without any evidence of coarctation.  There was mild pericardial ef

## 2019-10-07 ENCOUNTER — LAB ENCOUNTER (OUTPATIENT)
Dept: LAB | Facility: HOSPITAL | Age: 29
End: 2019-10-07
Attending: OBSTETRICS & GYNECOLOGY
Payer: COMMERCIAL

## 2019-10-07 DIAGNOSIS — Z34.02 ENCOUNTER FOR SUPERVISION OF NORMAL FIRST PREGNANCY IN SECOND TRIMESTER: ICD-10-CM

## 2019-10-07 PROCEDURE — 82950 GLUCOSE TEST: CPT

## 2019-10-07 PROCEDURE — 36415 COLL VENOUS BLD VENIPUNCTURE: CPT

## 2019-10-07 PROCEDURE — 85025 COMPLETE CBC W/AUTO DIFF WBC: CPT

## 2019-10-16 DIAGNOSIS — R73.09 ELEVATED GLUCOSE TOLERANCE TEST: Primary | ICD-10-CM

## 2019-10-16 NOTE — PROGRESS NOTES
Patient aware of 1 hr glucose results and recommendations for 3 hrs glucose testing.  Order placed in Epic Patient verbalizes understanding

## 2019-10-19 ENCOUNTER — LABORATORY ENCOUNTER (OUTPATIENT)
Dept: LAB | Facility: HOSPITAL | Age: 29
End: 2019-10-19
Attending: OBSTETRICS & GYNECOLOGY
Payer: COMMERCIAL

## 2019-10-19 DIAGNOSIS — R73.09 ELEVATED GLUCOSE TOLERANCE TEST: ICD-10-CM

## 2019-10-19 PROCEDURE — 36415 COLL VENOUS BLD VENIPUNCTURE: CPT

## 2019-10-19 PROCEDURE — 82962 GLUCOSE BLOOD TEST: CPT

## 2019-10-19 PROCEDURE — 82952 GTT-ADDED SAMPLES: CPT

## 2019-10-19 PROCEDURE — 82951 GLUCOSE TOLERANCE TEST (GTT): CPT

## 2019-10-23 ENCOUNTER — ROUTINE PRENATAL (OUTPATIENT)
Dept: OBGYN CLINIC | Facility: CLINIC | Age: 29
End: 2019-10-23
Payer: COMMERCIAL

## 2019-10-23 VITALS
SYSTOLIC BLOOD PRESSURE: 102 MMHG | WEIGHT: 136 LBS | HEART RATE: 84 BPM | BODY MASS INDEX: 22.66 KG/M2 | HEIGHT: 65 IN | DIASTOLIC BLOOD PRESSURE: 68 MMHG

## 2019-10-23 DIAGNOSIS — O26.892 DYSURIA DURING PREGNANCY IN SECOND TRIMESTER: Primary | ICD-10-CM

## 2019-10-23 DIAGNOSIS — O35.8XX0 ANOMALY OF HEART OF FETUS AFFECTING PREGNANCY, ANTEPARTUM, SINGLE OR UNSPECIFIED FETUS: ICD-10-CM

## 2019-10-23 DIAGNOSIS — M62.89 PELVIC FLOOR DYSFUNCTION: ICD-10-CM

## 2019-10-23 DIAGNOSIS — O99.891 DISORDER OF MUSCULOSKELETAL SYSTEM DURING PREGNANCY: ICD-10-CM

## 2019-10-23 DIAGNOSIS — R30.0 DYSURIA DURING PREGNANCY IN SECOND TRIMESTER: Primary | ICD-10-CM

## 2019-10-23 DIAGNOSIS — O35.8XX0 PYELECTASIS OF FETUS ON PRENATAL ULTRASOUND: ICD-10-CM

## 2019-10-23 DIAGNOSIS — O99.810 ABNORMAL GLUCOSE TOLERANCE TEST (GTT) DURING PREGNANCY, ANTEPARTUM: ICD-10-CM

## 2019-10-23 PROBLEM — O35.EXX0 PYELECTASIS OF FETUS ON PRENATAL ULTRASOUND: Status: ACTIVE | Noted: 2019-10-23

## 2019-10-23 PROBLEM — O35.BXX0 FETAL CARDIAC ANOMALY COMPLICATING PREGNANCY, ANTEPARTUM: Status: ACTIVE | Noted: 2019-10-23

## 2019-10-23 PROCEDURE — 99214 OFFICE O/P EST MOD 30 MIN: CPT | Performed by: OBSTETRICS & GYNECOLOGY

## 2019-10-23 PROCEDURE — 87660 TRICHOMONAS VAGIN DIR PROBE: CPT | Performed by: OBSTETRICS & GYNECOLOGY

## 2019-10-23 PROCEDURE — 81002 URINALYSIS NONAUTO W/O SCOPE: CPT | Performed by: OBSTETRICS & GYNECOLOGY

## 2019-10-23 PROCEDURE — 87086 URINE CULTURE/COLONY COUNT: CPT | Performed by: OBSTETRICS & GYNECOLOGY

## 2019-10-23 PROCEDURE — 84156 ASSAY OF PROTEIN URINE: CPT | Performed by: OBSTETRICS & GYNECOLOGY

## 2019-10-23 PROCEDURE — 82570 ASSAY OF URINE CREATININE: CPT | Performed by: OBSTETRICS & GYNECOLOGY

## 2019-10-23 PROCEDURE — 87480 CANDIDA DNA DIR PROBE: CPT | Performed by: OBSTETRICS & GYNECOLOGY

## 2019-10-23 PROCEDURE — 87510 GARDNER VAG DNA DIR PROBE: CPT | Performed by: OBSTETRICS & GYNECOLOGY

## 2019-10-23 NOTE — PROGRESS NOTES
PROBLEM VISIT DURING PREGNANCY    Chief complaint: Patient presents with: Other: Urinary frequency and dysuria at 24w4d pregnant      Subjective:     HPI: Myriam Tidwell is a 34year old  at 18w2d here for possible UTI.  Called office today & a SVC,borderline measurements of aortic valve and mitral valve annuli, and low suspicion for subaortic VSD. Pediatric cardiologist recommended follow up fetal echo in 1 month, cell free DNA testing for trisomies, and DiGeorge Syndrome.      The cell free D Cholelithiasis 1/19/2016   • Dysmenorrhea     Significant enough she would miss school. OCP helped. • Lactose intolerance    • Menorrhagia     Menses regular, but lasting 7-10 days & painful. OCP helped.     • Palpitations 2016 2016 Echo LVEF 70-75%, Intimate partner violence:        Fear of current or ex partner: Not on file        Emotionally abused: Not on file        Physically abused: Not on file        Forced sexual activity: Not on file    Other Topics      Concerns:         Service: Not Levators hypertonic with left more than right. Tender right obturator. Neurological: She is alert and oriented to person, place, and time. No cranial nerve deficit. Skin: Skin is warm and dry. Psychiatric: She has a normal mood and affect.  Her beh SCOPE  -     PROTEIN AND CREATININE, RANDOM URINE; Future  -     URINE CULTURE, ROUTINE; Future  -     URINALYSIS, ROUTINE; Future  -     VAGINITIS/VAGINOSIS, DNA PROBE; Future  -     PELVIC FLOOR THERAPY - INTERNAL  -     PROTEIN,TOTAL,URINE, RANDOM;  Futu Wilber Morrissey MD  [facility] - OBGYN

## 2019-10-28 ENCOUNTER — ROUTINE PRENATAL (OUTPATIENT)
Dept: OBGYN CLINIC | Facility: CLINIC | Age: 29
End: 2019-10-28
Payer: COMMERCIAL

## 2019-10-28 VITALS
HEIGHT: 65 IN | WEIGHT: 139.81 LBS | BODY MASS INDEX: 23.29 KG/M2 | DIASTOLIC BLOOD PRESSURE: 60 MMHG | SYSTOLIC BLOOD PRESSURE: 100 MMHG

## 2019-10-28 DIAGNOSIS — Z3A.25 25 WEEKS GESTATION OF PREGNANCY: ICD-10-CM

## 2019-10-28 DIAGNOSIS — Z34.02 ENCOUNTER FOR SUPERVISION OF NORMAL FIRST PREGNANCY IN SECOND TRIMESTER: Primary | ICD-10-CM

## 2019-10-28 PROCEDURE — 81002 URINALYSIS NONAUTO W/O SCOPE: CPT | Performed by: OBSTETRICS & GYNECOLOGY

## 2019-10-28 NOTE — PROGRESS NOTES
Patient has no complaints  - HIV and TDAP next visit  1.  B/l fetal hydronephrosis, possible coarctation of aorta   - fetal ECHO normal - repeat 1 months  - f/u with MFM   - genetic testing normal

## 2019-11-06 ENCOUNTER — OFFICE VISIT (OUTPATIENT)
Dept: PERINATAL CARE | Facility: HOSPITAL | Age: 29
End: 2019-11-06
Attending: PEDIATRICS
Payer: COMMERCIAL

## 2019-11-06 PROCEDURE — 93325 DOPPLER ECHO COLOR FLOW MAPG: CPT

## 2019-11-06 PROCEDURE — 76827 ECHO EXAM OF FETAL HEART: CPT

## 2019-11-06 PROCEDURE — 76825 ECHO EXAM OF FETAL HEART: CPT

## 2019-11-06 NOTE — PROGRESS NOTES
Steffanie Light  Dear Dr. March Adjutant :     I had the pleasure of seeing your patient Maged Winkler in the fetal echocardiography lab at [facility] on 2019.     She is a 33 yo  26w4d IUP with suspected fetal congenital he hypertrophied. The pulmonary valve appeared to be normal with no stenosis. There was normal bifurcation of the main pulmonary artery. The ductus arteriosus was open with a normal right to left shunting.  Two pulmonary veins appeared to be entering into the l Michael Nesbitt. She had many questions. I answered them all. She seemed to understand.      Thank you for allowing me to participate in the care of your patient. Reg Mata do not hesitate to call me with any questions or concerns.

## 2019-11-11 ENCOUNTER — OFFICE VISIT (OUTPATIENT)
Dept: PERINATAL CARE | Facility: HOSPITAL | Age: 29
End: 2019-11-11
Attending: OBSTETRICS & GYNECOLOGY
Payer: COMMERCIAL

## 2019-11-11 VITALS
WEIGHT: 139 LBS | DIASTOLIC BLOOD PRESSURE: 77 MMHG | HEART RATE: 83 BPM | SYSTOLIC BLOOD PRESSURE: 114 MMHG | BODY MASS INDEX: 23 KG/M2

## 2019-11-11 DIAGNOSIS — O35.8XX0 FETAL HYDRONEPHROSIS DURING PREGNANCY, ANTEPARTUM, SINGLE OR UNSPECIFIED FETUS: ICD-10-CM

## 2019-11-11 PROCEDURE — 99213 OFFICE O/P EST LOW 20 MIN: CPT | Performed by: OBSTETRICS & GYNECOLOGY

## 2019-11-11 PROCEDURE — 76816 OB US FOLLOW-UP PER FETUS: CPT | Performed by: OBSTETRICS & GYNECOLOGY

## 2019-11-11 NOTE — PROGRESS NOTES
Indication: Pyelectasis. ____________________________________________________________________________  History: Age: 34 years.   ____________________________________________________________________________  Dating:  LMP: 05/04/2019 EDC: 02/08/2020 GA by L in 15-26wks, >8mm in 26-30wks and >10mm beyond 30wks. The incidence of pyelectasis in the general population is 2-3%. The relationship between isolated pyelectasis and aneuploidy is unclear. Mason (1992) gave a risk of aneuploidy of 1 in 340.   In p resulting in hydronephrosis. Hydronephrosis in these cases is thought to be caused by partial obstruction because complete obstruction results in rapid destruction of the kidney.  Partial obstruction can lead to progressive deterioration of renal function i

## 2019-11-18 ENCOUNTER — ROUTINE PRENATAL (OUTPATIENT)
Dept: OBGYN CLINIC | Facility: CLINIC | Age: 29
End: 2019-11-18
Payer: COMMERCIAL

## 2019-11-18 VITALS
BODY MASS INDEX: 23.32 KG/M2 | HEIGHT: 65 IN | HEART RATE: 80 BPM | WEIGHT: 140 LBS | SYSTOLIC BLOOD PRESSURE: 118 MMHG | DIASTOLIC BLOOD PRESSURE: 70 MMHG

## 2019-11-18 DIAGNOSIS — Z34.03 ENCOUNTER FOR SUPERVISION OF NORMAL FIRST PREGNANCY IN THIRD TRIMESTER: Primary | ICD-10-CM

## 2019-11-18 DIAGNOSIS — Z36.9 PRENATAL SCREENING ENCOUNTER: ICD-10-CM

## 2019-11-18 DIAGNOSIS — Z3A.28 28 WEEKS GESTATION OF PREGNANCY: ICD-10-CM

## 2019-11-18 PROCEDURE — 81002 URINALYSIS NONAUTO W/O SCOPE: CPT | Performed by: OBSTETRICS & GYNECOLOGY

## 2019-11-18 NOTE — PROGRESS NOTES
Patient has no complaints  - TDAP info given  - HIV next visit  1. b/l fetal hydronephrosis  2.  Borderline polyhydramnios  - MFM following

## 2019-12-02 ENCOUNTER — ROUTINE PRENATAL (OUTPATIENT)
Dept: OBGYN CLINIC | Facility: CLINIC | Age: 29
End: 2019-12-02
Payer: COMMERCIAL

## 2019-12-02 VITALS
DIASTOLIC BLOOD PRESSURE: 62 MMHG | WEIGHT: 143 LBS | HEIGHT: 65 IN | BODY MASS INDEX: 23.82 KG/M2 | SYSTOLIC BLOOD PRESSURE: 100 MMHG

## 2019-12-02 DIAGNOSIS — Z34.03 ENCOUNTER FOR SUPERVISION OF NORMAL FIRST PREGNANCY IN THIRD TRIMESTER: Primary | ICD-10-CM

## 2019-12-02 DIAGNOSIS — Z3A.30 30 WEEKS GESTATION OF PREGNANCY: ICD-10-CM

## 2019-12-02 PROCEDURE — 81002 URINALYSIS NONAUTO W/O SCOPE: CPT | Performed by: OBSTETRICS & GYNECOLOGY

## 2019-12-02 NOTE — PROGRESS NOTES
Patient has no complaints  - declines TDAP  - will have HIV done at outside lab  1. B/l fetal hydronephrosis  2.  Borderline polyhydramnios  - f/u u/s with MFM in 4 weeks

## 2019-12-16 ENCOUNTER — LAB ENCOUNTER (OUTPATIENT)
Dept: LAB | Facility: HOSPITAL | Age: 29
End: 2019-12-16
Attending: OBSTETRICS & GYNECOLOGY
Payer: COMMERCIAL

## 2019-12-16 ENCOUNTER — ROUTINE PRENATAL (OUTPATIENT)
Dept: OBGYN CLINIC | Facility: CLINIC | Age: 29
End: 2019-12-16
Payer: COMMERCIAL

## 2019-12-16 VITALS
BODY MASS INDEX: 23.99 KG/M2 | HEIGHT: 65 IN | WEIGHT: 144 LBS | SYSTOLIC BLOOD PRESSURE: 100 MMHG | DIASTOLIC BLOOD PRESSURE: 60 MMHG

## 2019-12-16 DIAGNOSIS — Z34.03 ENCOUNTER FOR SUPERVISION OF NORMAL FIRST PREGNANCY IN THIRD TRIMESTER: ICD-10-CM

## 2019-12-16 DIAGNOSIS — Z3A.32 32 WEEKS GESTATION OF PREGNANCY: ICD-10-CM

## 2019-12-16 DIAGNOSIS — Z34.03 ENCOUNTER FOR SUPERVISION OF NORMAL FIRST PREGNANCY IN THIRD TRIMESTER: Primary | ICD-10-CM

## 2019-12-16 PROCEDURE — 36415 COLL VENOUS BLD VENIPUNCTURE: CPT

## 2019-12-16 PROCEDURE — 81002 URINALYSIS NONAUTO W/O SCOPE: CPT | Performed by: OBSTETRICS & GYNECOLOGY

## 2019-12-16 PROCEDURE — 87389 HIV-1 AG W/HIV-1&-2 AB AG IA: CPT

## 2019-12-17 NOTE — PROGRESS NOTES
No complaints  - HIV today  1. B/l hydronephrosis  - f/u u/s with MFM 2 weeks  2.  Borderline polyhydramnios

## 2019-12-30 ENCOUNTER — ROUTINE PRENATAL (OUTPATIENT)
Dept: OBGYN CLINIC | Facility: CLINIC | Age: 29
End: 2019-12-30
Payer: COMMERCIAL

## 2019-12-30 VITALS
WEIGHT: 145 LBS | BODY MASS INDEX: 24.16 KG/M2 | HEART RATE: 72 BPM | DIASTOLIC BLOOD PRESSURE: 60 MMHG | SYSTOLIC BLOOD PRESSURE: 100 MMHG | HEIGHT: 65 IN

## 2019-12-30 DIAGNOSIS — Z3A.34 34 WEEKS GESTATION OF PREGNANCY: ICD-10-CM

## 2019-12-30 DIAGNOSIS — Z34.03 ENCOUNTER FOR SUPERVISION OF NORMAL FIRST PREGNANCY IN THIRD TRIMESTER: Primary | ICD-10-CM

## 2019-12-30 PROCEDURE — 81002 URINALYSIS NONAUTO W/O SCOPE: CPT | Performed by: OBSTETRICS & GYNECOLOGY

## 2019-12-30 NOTE — PROGRESS NOTES
Patient has no complaints  - GBS next visit  1. B/l fetal hydronephrosis  2.  Borderline polyhydramnios  - f/u u/s with MFM in 1 weeks

## 2020-01-06 ENCOUNTER — OFFICE VISIT (OUTPATIENT)
Dept: PERINATAL CARE | Facility: HOSPITAL | Age: 30
End: 2020-01-06
Attending: OBSTETRICS & GYNECOLOGY
Payer: COMMERCIAL

## 2020-01-06 VITALS
WEIGHT: 145 LBS | DIASTOLIC BLOOD PRESSURE: 79 MMHG | HEART RATE: 79 BPM | SYSTOLIC BLOOD PRESSURE: 124 MMHG | BODY MASS INDEX: 24 KG/M2

## 2020-01-06 DIAGNOSIS — O35.8XX0 FETAL HYDRONEPHROSIS DURING PREGNANCY, ANTEPARTUM, SINGLE OR UNSPECIFIED FETUS: ICD-10-CM

## 2020-01-06 DIAGNOSIS — O36.5930 INTRAUTERINE GROWTH RESTRICTION (IUGR) AFFECTING CARE OF MOTHER, THIRD TRIMESTER, SINGLE OR UNSPECIFIED FETUS: ICD-10-CM

## 2020-01-06 DIAGNOSIS — O35.8XX0 PYELECTASIS OF FETUS ON PRENATAL ULTRASOUND: ICD-10-CM

## 2020-01-06 PROCEDURE — 76816 OB US FOLLOW-UP PER FETUS: CPT | Performed by: OBSTETRICS & GYNECOLOGY

## 2020-01-06 PROCEDURE — 76820 UMBILICAL ARTERY ECHO: CPT | Performed by: OBSTETRICS & GYNECOLOGY

## 2020-01-06 PROCEDURE — 99214 OFFICE O/P EST MOD 30 MIN: CPT | Performed by: OBSTETRICS & GYNECOLOGY

## 2020-01-06 PROCEDURE — 76819 FETAL BIOPHYS PROFIL W/O NST: CPT | Performed by: OBSTETRICS & GYNECOLOGY

## 2020-01-06 NOTE — PROGRESS NOTES
Indication: pylectasis. ____________________________________________________________________________  History: Age: 31 years.   ____________________________________________________________________________  Dating:  LMP: 05/04/2019 EDC: 02/08/2020 GA by movement counts.    ____________________________________________________________________________  Doppler:  Fetal Doppler:  Umbilical Artery: PS -39.7 cm/s    ED -21.73 cm/s    S/D ratio 2.23     RI 0.55     PI 0.78     TAMX -34.17 cm/s   Impression: jayme in some cases; however spontaneous improvement may occur. The evaluation of a  with  hydronephrosis includes an ultrasound examination after 48 hours if moderate to severe and after seven days if mild.  A voiding cystourethrogram (VCUG) is

## 2020-01-13 ENCOUNTER — ROUTINE PRENATAL (OUTPATIENT)
Dept: OBGYN CLINIC | Facility: CLINIC | Age: 30
End: 2020-01-13
Payer: COMMERCIAL

## 2020-01-13 VITALS
BODY MASS INDEX: 25.09 KG/M2 | HEIGHT: 65 IN | SYSTOLIC BLOOD PRESSURE: 106 MMHG | DIASTOLIC BLOOD PRESSURE: 60 MMHG | WEIGHT: 150.63 LBS

## 2020-01-13 DIAGNOSIS — Z34.03 ENCOUNTER FOR SUPERVISION OF NORMAL FIRST PREGNANCY IN THIRD TRIMESTER: Primary | ICD-10-CM

## 2020-01-13 DIAGNOSIS — Z3A.36 36 WEEKS GESTATION OF PREGNANCY: ICD-10-CM

## 2020-01-13 LAB — MULTISTIX LOT#: NORMAL NUMERIC

## 2020-01-13 PROCEDURE — 81002 URINALYSIS NONAUTO W/O SCOPE: CPT | Performed by: OBSTETRICS & GYNECOLOGY

## 2020-01-13 PROCEDURE — 87081 CULTURE SCREEN ONLY: CPT | Performed by: OBSTETRICS & GYNECOLOGY

## 2020-01-14 NOTE — PROGRESS NOTES
No complaints  1. IUGR - IOL 1/24/20  - return for NST  - GBS done  2.  B/l hydronephrosis  - urology consult after delivery

## 2020-01-15 ENCOUNTER — ROUTINE PRENATAL (OUTPATIENT)
Dept: OBGYN CLINIC | Facility: CLINIC | Age: 30
End: 2020-01-15
Payer: COMMERCIAL

## 2020-01-15 ENCOUNTER — APPOINTMENT (OUTPATIENT)
Dept: ULTRASOUND IMAGING | Facility: HOSPITAL | Age: 30
End: 2020-01-15
Attending: OBSTETRICS & GYNECOLOGY
Payer: COMMERCIAL

## 2020-01-15 ENCOUNTER — HOSPITAL ENCOUNTER (OUTPATIENT)
Facility: HOSPITAL | Age: 30
Setting detail: OBSERVATION
Discharge: HOME | End: 2020-01-16
Attending: OBSTETRICS & GYNECOLOGY | Admitting: OBSTETRICS & GYNECOLOGY
Payer: COMMERCIAL

## 2020-01-15 VITALS
DIASTOLIC BLOOD PRESSURE: 60 MMHG | SYSTOLIC BLOOD PRESSURE: 110 MMHG | HEIGHT: 65 IN | WEIGHT: 150 LBS | BODY MASS INDEX: 24.99 KG/M2

## 2020-01-15 DIAGNOSIS — O36.5930 INTRAUTERINE GROWTH RESTRICTION (IUGR) AFFECTING CARE OF MOTHER, THIRD TRIMESTER, SINGLE OR UNSPECIFIED FETUS: Primary | ICD-10-CM

## 2020-01-15 DIAGNOSIS — O35.8XX0 PYELECTASIS OF FETUS ON PRENATAL ULTRASOUND: ICD-10-CM

## 2020-01-15 DIAGNOSIS — O36.8390 FETAL HEART RATE DECELERATIONS AFFECTING MANAGEMENT OF MOTHER: ICD-10-CM

## 2020-01-15 DIAGNOSIS — Z22.330 GBS CARRIER: ICD-10-CM

## 2020-01-15 DIAGNOSIS — O35.8XX0 ANOMALY OF HEART OF FETUS AFFECTING PREGNANCY, ANTEPARTUM, SINGLE OR UNSPECIFIED FETUS: ICD-10-CM

## 2020-01-15 PROBLEM — R30.0 DYSURIA DURING PREGNANCY IN SECOND TRIMESTER: Status: RESOLVED | Noted: 2019-10-23 | Resolved: 2020-01-15

## 2020-01-15 PROBLEM — O26.892 DYSURIA DURING PREGNANCY IN SECOND TRIMESTER: Status: RESOLVED | Noted: 2019-10-23 | Resolved: 2020-01-15

## 2020-01-15 PROBLEM — Z34.90 PREGNANCY: Status: ACTIVE | Noted: 2020-01-15

## 2020-01-15 LAB
ANTIBODY SCREEN: NEGATIVE
DEPRECATED RDW RBC AUTO: 43.8 FL (ref 35.1–46.3)
ERYTHROCYTE [DISTWIDTH] IN BLOOD BY AUTOMATED COUNT: 12.8 % (ref 11–15)
HCT VFR BLD AUTO: 40.5 % (ref 35–48)
HGB BLD-MCNC: 13.9 G/DL (ref 12–16)
MCH RBC QN AUTO: 32.4 PG (ref 26–34)
MCHC RBC AUTO-ENTMCNC: 34.3 G/DL (ref 31–37)
MCV RBC AUTO: 94.4 FL (ref 80–100)
MULTISTIX LOT#: NORMAL NUMERIC
PLATELET # BLD AUTO: 210 10(3)UL (ref 150–450)
RBC # BLD AUTO: 4.29 X10(6)UL (ref 3.8–5.3)
RH BLOOD TYPE: POSITIVE
T PALLIDUM AB SER QL IA: NONREACTIVE
WBC # BLD AUTO: 13.8 X10(3) UL (ref 4–11)

## 2020-01-15 PROCEDURE — 59025 FETAL NON-STRESS TEST: CPT | Performed by: OBSTETRICS & GYNECOLOGY

## 2020-01-15 PROCEDURE — 76819 FETAL BIOPHYS PROFIL W/O NST: CPT | Performed by: OBSTETRICS & GYNECOLOGY

## 2020-01-15 PROCEDURE — 99218 INITIAL OBSERVATION CARE,LEVL I: CPT | Performed by: OBSTETRICS & GYNECOLOGY

## 2020-01-15 PROCEDURE — 81002 URINALYSIS NONAUTO W/O SCOPE: CPT | Performed by: OBSTETRICS & GYNECOLOGY

## 2020-01-15 RX ORDER — TRISODIUM CITRATE DIHYDRATE AND CITRIC ACID MONOHYDRATE 500; 334 MG/5ML; MG/5ML
30 SOLUTION ORAL AS NEEDED
Status: DISCONTINUED | OUTPATIENT
Start: 2020-01-15 | End: 2020-01-16

## 2020-01-15 RX ORDER — IBUPROFEN 600 MG/1
600 TABLET ORAL ONCE AS NEEDED
Status: DISCONTINUED | OUTPATIENT
Start: 2020-01-15 | End: 2020-01-16

## 2020-01-15 RX ORDER — BETAMETHASONE SODIUM PHOSPHATE AND BETAMETHASONE ACETATE 3; 3 MG/ML; MG/ML
12 INJECTION, SUSPENSION INTRA-ARTICULAR; INTRALESIONAL; INTRAMUSCULAR; SOFT TISSUE EVERY 24 HOURS
Status: DISCONTINUED | OUTPATIENT
Start: 2020-01-15 | End: 2020-01-16

## 2020-01-15 RX ORDER — AMMONIA INHALANTS 0.04 G/.3ML
0.3 INHALANT RESPIRATORY (INHALATION) AS NEEDED
Status: DISCONTINUED | OUTPATIENT
Start: 2020-01-15 | End: 2020-01-16

## 2020-01-15 RX ORDER — ONDANSETRON 2 MG/ML
4 INJECTION INTRAMUSCULAR; INTRAVENOUS EVERY 6 HOURS PRN
Status: DISCONTINUED | OUTPATIENT
Start: 2020-01-15 | End: 2020-01-16

## 2020-01-15 RX ORDER — SODIUM CHLORIDE, SODIUM LACTATE, POTASSIUM CHLORIDE, CALCIUM CHLORIDE 600; 310; 30; 20 MG/100ML; MG/100ML; MG/100ML; MG/100ML
INJECTION, SOLUTION INTRAVENOUS CONTINUOUS
Status: DISCONTINUED | OUTPATIENT
Start: 2020-01-15 | End: 2020-01-16

## 2020-01-15 RX ORDER — DEXTROSE, SODIUM CHLORIDE, SODIUM LACTATE, POTASSIUM CHLORIDE, AND CALCIUM CHLORIDE 5; .6; .31; .03; .02 G/100ML; G/100ML; G/100ML; G/100ML; G/100ML
INJECTION, SOLUTION INTRAVENOUS AS NEEDED
Status: DISCONTINUED | OUTPATIENT
Start: 2020-01-15 | End: 2020-01-16

## 2020-01-15 RX ORDER — TERBUTALINE SULFATE 1 MG/ML
0.25 INJECTION, SOLUTION SUBCUTANEOUS AS NEEDED
Status: DISCONTINUED | OUTPATIENT
Start: 2020-01-15 | End: 2020-01-16

## 2020-01-15 RX ORDER — BETAMETHASONE SODIUM PHOSPHATE AND BETAMETHASONE ACETATE 3; 3 MG/ML; MG/ML
INJECTION, SUSPENSION INTRA-ARTICULAR; INTRALESIONAL; INTRAMUSCULAR; SOFT TISSUE
Status: COMPLETED
Start: 2020-01-15 | End: 2020-01-15

## 2020-01-15 NOTE — PROGRESS NOTES
SAL    Fetus very active. Noticing some slight contractions. No vaginal bleeding or leaking fluid. No headache, vision changes, epigastric pain.      31year old  at 36w4d   MARTI 20 by LMP c/w 9w1d US  Male fetus  A+     IUGR  -MFM US 27 wk - declines PT   Abn 1 hr GTT with normal 3 hr GTT  3rd trim HIV neg   Tdap - declines  GBS+  To L&D due to fetal heart decelerations noted on NST in office. Report given to charge MARY.      Ashleigh Garcia MD

## 2020-01-16 ENCOUNTER — TELEPHONE (OUTPATIENT)
Dept: OBGYN CLINIC | Facility: CLINIC | Age: 30
End: 2020-01-16

## 2020-01-16 ENCOUNTER — HOSPITAL ENCOUNTER (OUTPATIENT)
Facility: HOSPITAL | Age: 30
Discharge: HOME | End: 2020-01-16
Attending: OBSTETRICS & GYNECOLOGY | Admitting: OBSTETRICS & GYNECOLOGY
Payer: COMMERCIAL

## 2020-01-16 ENCOUNTER — APPOINTMENT (OUTPATIENT)
Dept: OBGYN CLINIC | Facility: HOSPITAL | Age: 30
End: 2020-01-16
Payer: COMMERCIAL

## 2020-01-16 VITALS
RESPIRATION RATE: 16 BRPM | HEART RATE: 75 BPM | DIASTOLIC BLOOD PRESSURE: 56 MMHG | HEIGHT: 65 IN | BODY MASS INDEX: 24.99 KG/M2 | SYSTOLIC BLOOD PRESSURE: 102 MMHG | WEIGHT: 150 LBS | TEMPERATURE: 98 F

## 2020-01-16 PROCEDURE — 96372 THER/PROPH/DIAG INJ SC/IM: CPT

## 2020-01-16 RX ORDER — BETAMETHASONE SODIUM PHOSPHATE AND BETAMETHASONE ACETATE 3; 3 MG/ML; MG/ML
INJECTION, SUSPENSION INTRA-ARTICULAR; INTRALESIONAL; INTRAMUSCULAR; SOFT TISSUE
Status: DISCONTINUED
Start: 2020-01-16 | End: 2020-01-16

## 2020-01-16 RX ORDER — BETAMETHASONE SODIUM PHOSPHATE AND BETAMETHASONE ACETATE 3; 3 MG/ML; MG/ML
12 INJECTION, SUSPENSION INTRA-ARTICULAR; INTRALESIONAL; INTRAMUSCULAR; SOFT TISSUE ONCE
Status: COMPLETED | OUTPATIENT
Start: 2020-01-16 | End: 2020-01-16

## 2020-01-16 NOTE — NST
Nonstress Test   Patient: Salena Saavedra    Gestation: 36w5d    NST:       Variability: Moderate           Accelerations: Yes           Decelerations: None            Baseline: 125 BPM           Uterine Irritability: No           Contractions: Irregul

## 2020-01-16 NOTE — PROGRESS NOTES
[facility] Patient resting comfortably in bed. POC discussed with patient. All questions answered. Patient verbalized understanding. Call light within reach.

## 2020-01-16 NOTE — CM/SW NOTE
SW order placed based on OUD screening. Pt was prescribed Norco for gallbladder removal in the past year. Pt states she did not take all of the medication. SW confirmed in pt's chart that she had surgery in 2/2019.     No further needs identified at this ti

## 2020-01-16 NOTE — PROGRESS NOTES
After further evaluation of fetal tracing, MD Mcgarry and Md Harjit Hightower called for them to view tracing at [phone] with questionable decel.   Patient returned from the bathroom and plugged monitor  In herself ,bent far over  RN at bedside to discharge patient

## 2020-01-16 NOTE — PROGRESS NOTES
Pt is a 31year old female admitted to 113/113-A. Patient presents with:   Complication: Decels on the monitor in office today. IUGR. Pt is  36w4d intra-uterine pregnancy. History obtained, consents signed.  Oriented to room, staff,

## 2020-01-16 NOTE — CONSULTS
[id number] Patient Status:  Inpatient    1990 MRN [id number]   Location [address] [facility] Attending Yumiko Mace, [address] [facility] Day # 1 PCP Rafiq Alvarado MD     SUBJECTIVE:  Re LAPAROSCOPIC CHOLECYSTECTOMY N/A 2019    Performed by Verna Koenig MD at [facility] MAIN OR   • [address]         Past OB History:  OB History    Para Term  AB Living   1 0 0 0 0 0   SAB TAB Ectopic Multiple Live Births   0 0 0 0 0 PM

## 2020-01-16 NOTE — TELEPHONE ENCOUNTER
Fax was sent to Char Ross from [facility] to get the fetal monitor strip fax from yesterday per Char Ross per dr Gladis Ruelas. Ok'ed by dr YOUNG.  Confirmation fax was received on 01-16-20 @ 12:53 pm. Thanks

## 2020-01-16 NOTE — PROGRESS NOTES
Tracing viewed per charge RN/ Reactive NST noted.  Patient discharged home accompanied by  with instructions and patient verbalized understanding

## 2020-01-17 ENCOUNTER — TELEPHONE (OUTPATIENT)
Dept: OBGYN UNIT | Facility: HOSPITAL | Age: 30
End: 2020-01-17

## 2020-01-17 NOTE — PAYOR COMM NOTE
--------------  ADMISSION REVIEW     Payor: Serena Delgado #:  [id number]  Authorization Number: PENDING    Admit date: 1/15/20  Admit time: 4997       Admitting Physician: Fausto Oliveira DO  Attending Physician:  No att. providers found  [facility] negative  -fetal echo at 26w4d - Overall unremarkable fetal echocardiogram other than mild right atrial and right ventricular enlargements, mild pericardial effusion on the right, mild RV free wall hypertrophy, enlarged coronary sinus with possibly continu Decelerations: None                                                                                          Baseline: 125 BPM                                                                                     Uterine Irritability: No

## 2020-01-17 NOTE — PAYOR COMM NOTE
--------------  ADMISSION REVIEW     Payor: Piter Fair #:  [id number]  Authorization Number: PENDING    Admit date: 1/15/20  Admit time: 9159       Admitting Physician: Jeremiah Rivera DO  Attending Physician:  No att. providers found  Christopher Jaffe negative  -fetal echo at 26w4d - Overall unremarkable fetal echocardiogram other than mild right atrial and right ventricular enlargements, mild pericardial effusion on the right, mild RV free wall hypertrophy, enlarged coronary sinus with possibly continu Decelerations: None                                                                                          Baseline: 125 BPM                                                                                     Uterine Irritability: No

## 2020-01-17 NOTE — H&P
[facility]    History & Physical    [address] Patient Status:  Inpatient    1990 MRN [id number]   Location 1818 [facility] Attending No att. providers found   [address] Day # 1 PCP Krishna Garcia MD     Date of Admi Grandmother    • No Known Problems Maternal Grandfather    • No Known Problems Paternal Grandmother    • No Known Problems Paternal Grandfather    • Other (congenital heart disease - possible VSD, closed spontaneously) Brother      Social History: Social H

## 2020-01-18 ENCOUNTER — ANESTHESIA (OUTPATIENT)
Dept: OBGYN UNIT | Facility: HOSPITAL | Age: 30
End: 2020-01-18
Payer: COMMERCIAL

## 2020-01-18 ENCOUNTER — APPOINTMENT (OUTPATIENT)
Dept: OBGYN CLINIC | Facility: HOSPITAL | Age: 30
End: 2020-01-18
Payer: COMMERCIAL

## 2020-01-18 ENCOUNTER — ANESTHESIA EVENT (OUTPATIENT)
Dept: OBGYN UNIT | Facility: HOSPITAL | Age: 30
End: 2020-01-18
Payer: COMMERCIAL

## 2020-01-18 ENCOUNTER — HOSPITAL ENCOUNTER (INPATIENT)
Facility: HOSPITAL | Age: 30
LOS: 4 days | Discharge: HOME | End: 2020-01-22
Attending: OBSTETRICS & GYNECOLOGY | Admitting: OBSTETRICS & GYNECOLOGY
Payer: COMMERCIAL

## 2020-01-18 PROBLEM — Z98.891 STATUS POST PRIMARY LOW TRANSVERSE CESAREAN SECTION: Status: ACTIVE | Noted: 2020-01-18

## 2020-01-18 PROBLEM — O42.90 PROM (PREMATURE RUPTURE OF MEMBRANES): Status: ACTIVE | Noted: 2020-01-18

## 2020-01-18 LAB
ANTIBODY SCREEN: NEGATIVE
DEPRECATED RDW RBC AUTO: 43.9 FL (ref 35.1–46.3)
ERYTHROCYTE [DISTWIDTH] IN BLOOD BY AUTOMATED COUNT: 12.6 % (ref 11–15)
HCT VFR BLD AUTO: 38.6 % (ref 35–48)
HGB BLD-MCNC: 13.3 G/DL (ref 12–16)
MCH RBC QN AUTO: 32.7 PG (ref 26–34)
MCHC RBC AUTO-ENTMCNC: 34.5 G/DL (ref 31–37)
MCV RBC AUTO: 94.8 FL (ref 80–100)
PLATELET # BLD AUTO: 224 10(3)UL (ref 150–450)
RBC # BLD AUTO: 4.07 X10(6)UL (ref 3.8–5.3)
RH BLOOD TYPE: POSITIVE
T PALLIDUM AB SER QL IA: NONREACTIVE
WBC # BLD AUTO: 16.6 X10(3) UL (ref 4–11)

## 2020-01-18 PROCEDURE — 59510 CESAREAN DELIVERY: CPT | Performed by: OBSTETRICS & GYNECOLOGY

## 2020-01-18 RX ORDER — TERBUTALINE SULFATE 1 MG/ML
0.25 INJECTION, SOLUTION SUBCUTANEOUS AS NEEDED
Status: DISCONTINUED | OUTPATIENT
Start: 2020-01-18 | End: 2020-01-18

## 2020-01-18 RX ORDER — KETOROLAC TROMETHAMINE 30 MG/ML
INJECTION, SOLUTION INTRAMUSCULAR; INTRAVENOUS
Status: COMPLETED
Start: 2020-01-18 | End: 2020-01-18

## 2020-01-18 RX ORDER — NALBUPHINE HCL 10 MG/ML
2.5 AMPUL (ML) INJECTION EVERY 4 HOURS PRN
Status: DISCONTINUED | OUTPATIENT
Start: 2020-01-18 | End: 2020-01-23

## 2020-01-18 RX ORDER — KETOROLAC TROMETHAMINE 30 MG/ML
30 INJECTION, SOLUTION INTRAMUSCULAR; INTRAVENOUS EVERY 6 HOURS PRN
Status: ACTIVE | OUTPATIENT
Start: 2020-01-18 | End: 2020-01-18

## 2020-01-18 RX ORDER — HYDROCODONE BITARTRATE AND ACETAMINOPHEN 10; 325 MG/1; MG/1
1 TABLET ORAL EVERY 4 HOURS PRN
Status: DISCONTINUED | OUTPATIENT
Start: 2020-01-18 | End: 2020-01-23

## 2020-01-18 RX ORDER — BISACODYL 10 MG
10 SUPPOSITORY, RECTAL RECTAL
Status: DISCONTINUED | OUTPATIENT
Start: 2020-01-18 | End: 2020-01-23

## 2020-01-18 RX ORDER — ONDANSETRON 2 MG/ML
INJECTION INTRAMUSCULAR; INTRAVENOUS AS NEEDED
Status: DISCONTINUED | OUTPATIENT
Start: 2020-01-18 | End: 2020-01-18 | Stop reason: SURG

## 2020-01-18 RX ORDER — KETOROLAC TROMETHAMINE 30 MG/ML
30 INJECTION, SOLUTION INTRAMUSCULAR; INTRAVENOUS EVERY 6 HOURS PRN
Status: DISPENSED | OUTPATIENT
Start: 2020-01-18 | End: 2020-01-19

## 2020-01-18 RX ORDER — AMMONIA INHALANTS 0.04 G/.3ML
0.3 INHALANT RESPIRATORY (INHALATION) AS NEEDED
Status: DISCONTINUED | OUTPATIENT
Start: 2020-01-18 | End: 2020-01-18

## 2020-01-18 RX ORDER — FAMOTIDINE 10 MG/ML
20 INJECTION, SOLUTION INTRAVENOUS ONCE
Status: DISCONTINUED | OUTPATIENT
Start: 2020-01-18 | End: 2020-01-18

## 2020-01-18 RX ORDER — TRISODIUM CITRATE DIHYDRATE AND CITRIC ACID MONOHYDRATE 500; 334 MG/5ML; MG/5ML
30 SOLUTION ORAL ONCE
Status: DISCONTINUED | OUTPATIENT
Start: 2020-01-18 | End: 2020-01-18

## 2020-01-18 RX ORDER — MORPHINE SULFATE 0.5 MG/ML
0.2 INJECTION, SOLUTION EPIDURAL; INTRATHECAL; INTRAVENOUS ONCE
Status: DISCONTINUED | OUTPATIENT
Start: 2020-01-18 | End: 2020-01-18

## 2020-01-18 RX ORDER — ZOLPIDEM TARTRATE 5 MG/1
5 TABLET ORAL NIGHTLY PRN
Status: DISCONTINUED | OUTPATIENT
Start: 2020-01-18 | End: 2020-01-23

## 2020-01-18 RX ORDER — IBUPROFEN 600 MG/1
600 TABLET ORAL EVERY 6 HOURS SCHEDULED
Status: DISCONTINUED | OUTPATIENT
Start: 2020-01-19 | End: 2020-01-23

## 2020-01-18 RX ORDER — MORPHINE SULFATE 2 MG/ML
INJECTION, SOLUTION INTRAMUSCULAR; INTRAVENOUS AS NEEDED
Status: DISCONTINUED | OUTPATIENT
Start: 2020-01-18 | End: 2020-01-18 | Stop reason: SURG

## 2020-01-18 RX ORDER — HYDROCODONE BITARTRATE AND ACETAMINOPHEN 5; 325 MG/1; MG/1
1 TABLET ORAL EVERY 4 HOURS PRN
Status: DISCONTINUED | OUTPATIENT
Start: 2020-01-18 | End: 2020-01-23

## 2020-01-18 RX ORDER — KETOROLAC TROMETHAMINE 30 MG/ML
30 INJECTION, SOLUTION INTRAMUSCULAR; INTRAVENOUS ONCE AS NEEDED
Status: DISCONTINUED | OUTPATIENT
Start: 2020-01-18 | End: 2020-01-18 | Stop reason: HOSPADM

## 2020-01-18 RX ORDER — HYDROMORPHONE HYDROCHLORIDE 1 MG/ML
0.4 INJECTION, SOLUTION INTRAMUSCULAR; INTRAVENOUS; SUBCUTANEOUS EVERY 5 MIN PRN
Status: DISCONTINUED | OUTPATIENT
Start: 2020-01-18 | End: 2020-01-18 | Stop reason: HOSPADM

## 2020-01-18 RX ORDER — METOCLOPRAMIDE HYDROCHLORIDE 5 MG/ML
10 INJECTION INTRAMUSCULAR; INTRAVENOUS ONCE
Status: DISCONTINUED | OUTPATIENT
Start: 2020-01-18 | End: 2020-01-18

## 2020-01-18 RX ORDER — HYDROMORPHONE HYDROCHLORIDE 1 MG/ML
0.4 INJECTION, SOLUTION INTRAMUSCULAR; INTRAVENOUS; SUBCUTANEOUS EVERY 2 HOUR PRN
Status: ACTIVE | OUTPATIENT
Start: 2020-01-18 | End: 2020-01-19

## 2020-01-18 RX ORDER — DIPHENHYDRAMINE HCL 25 MG
25 CAPSULE ORAL EVERY 4 HOURS PRN
Status: DISCONTINUED | OUTPATIENT
Start: 2020-01-18 | End: 2020-01-23

## 2020-01-18 RX ORDER — NALOXONE HYDROCHLORIDE 0.4 MG/ML
0.08 INJECTION, SOLUTION INTRAMUSCULAR; INTRAVENOUS; SUBCUTANEOUS
Status: ACTIVE | OUTPATIENT
Start: 2020-01-18 | End: 2020-01-19

## 2020-01-18 RX ORDER — MELATONIN
325
Status: ON HOLD | COMMUNITY
End: 2020-01-22

## 2020-01-18 RX ORDER — MORPHINE SULFATE 4 MG/ML
2 INJECTION, SOLUTION INTRAMUSCULAR; INTRAVENOUS EVERY 2 HOUR PRN
Status: ACTIVE | OUTPATIENT
Start: 2020-01-18 | End: 2020-01-19

## 2020-01-18 RX ORDER — ONDANSETRON 2 MG/ML
4 INJECTION INTRAMUSCULAR; INTRAVENOUS ONCE AS NEEDED
Status: DISCONTINUED | OUTPATIENT
Start: 2020-01-18 | End: 2020-01-18 | Stop reason: HOSPADM

## 2020-01-18 RX ORDER — DOCUSATE SODIUM 100 MG/1
100 CAPSULE, LIQUID FILLED ORAL
Status: DISCONTINUED | OUTPATIENT
Start: 2020-01-19 | End: 2020-01-23

## 2020-01-18 RX ORDER — SODIUM CHLORIDE, SODIUM LACTATE, POTASSIUM CHLORIDE, CALCIUM CHLORIDE 600; 310; 30; 20 MG/100ML; MG/100ML; MG/100ML; MG/100ML
INJECTION, SOLUTION INTRAVENOUS CONTINUOUS
Status: DISCONTINUED | OUTPATIENT
Start: 2020-01-18 | End: 2020-01-23

## 2020-01-18 RX ORDER — ONDANSETRON 2 MG/ML
4 INJECTION INTRAMUSCULAR; INTRAVENOUS EVERY 6 HOURS PRN
Status: DISCONTINUED | OUTPATIENT
Start: 2020-01-18 | End: 2020-01-18

## 2020-01-18 RX ORDER — PHENYLEPHRINE HCL 10 MG/ML
VIAL (ML) INJECTION AS NEEDED
Status: DISCONTINUED | OUTPATIENT
Start: 2020-01-18 | End: 2020-01-18 | Stop reason: SURG

## 2020-01-18 RX ORDER — DEXTROSE, SODIUM CHLORIDE, SODIUM LACTATE, POTASSIUM CHLORIDE, AND CALCIUM CHLORIDE 5; .6; .31; .03; .02 G/100ML; G/100ML; G/100ML; G/100ML; G/100ML
INJECTION, SOLUTION INTRAVENOUS AS NEEDED
Status: DISCONTINUED | OUTPATIENT
Start: 2020-01-18 | End: 2020-01-18

## 2020-01-18 RX ORDER — SODIUM CHLORIDE, SODIUM LACTATE, POTASSIUM CHLORIDE, CALCIUM CHLORIDE 600; 310; 30; 20 MG/100ML; MG/100ML; MG/100ML; MG/100ML
INJECTION, SOLUTION INTRAVENOUS CONTINUOUS
Status: DISCONTINUED | OUTPATIENT
Start: 2020-01-18 | End: 2020-01-18

## 2020-01-18 RX ORDER — ONDANSETRON 2 MG/ML
4 INJECTION INTRAMUSCULAR; INTRAVENOUS EVERY 6 HOURS PRN
Status: DISCONTINUED | OUTPATIENT
Start: 2020-01-18 | End: 2020-01-23

## 2020-01-18 RX ORDER — CEFAZOLIN SODIUM/WATER 2 G/20 ML
2 SYRINGE (ML) INTRAVENOUS ONCE
Status: COMPLETED | OUTPATIENT
Start: 2020-01-18 | End: 2020-01-18

## 2020-01-18 RX ORDER — DEXTROSE, SODIUM CHLORIDE, SODIUM LACTATE, POTASSIUM CHLORIDE, AND CALCIUM CHLORIDE 5; .6; .31; .03; .02 G/100ML; G/100ML; G/100ML; G/100ML; G/100ML
INJECTION, SOLUTION INTRAVENOUS CONTINUOUS
Status: DISCONTINUED | OUTPATIENT
Start: 2020-01-18 | End: 2020-01-23

## 2020-01-18 RX ORDER — DIPHENHYDRAMINE HYDROCHLORIDE 50 MG/ML
25 INJECTION INTRAMUSCULAR; INTRAVENOUS ONCE AS NEEDED
Status: DISCONTINUED | OUTPATIENT
Start: 2020-01-18 | End: 2020-01-18 | Stop reason: HOSPADM

## 2020-01-18 RX ORDER — BUPIVACAINE HYDROCHLORIDE 7.5 MG/ML
INJECTION, SOLUTION INTRASPINAL AS NEEDED
Status: DISCONTINUED | OUTPATIENT
Start: 2020-01-18 | End: 2020-01-18 | Stop reason: SURG

## 2020-01-18 RX ORDER — SIMETHICONE 80 MG
80 TABLET,CHEWABLE ORAL 3 TIMES DAILY PRN
Status: DISCONTINUED | OUTPATIENT
Start: 2020-01-18 | End: 2020-01-23

## 2020-01-18 RX ORDER — TRISODIUM CITRATE DIHYDRATE AND CITRIC ACID MONOHYDRATE 500; 334 MG/5ML; MG/5ML
30 SOLUTION ORAL AS NEEDED
Status: DISCONTINUED | OUTPATIENT
Start: 2020-01-18 | End: 2020-01-18

## 2020-01-18 RX ORDER — NALBUPHINE HCL 10 MG/ML
2.5 AMPUL (ML) INJECTION
Status: DISCONTINUED | OUTPATIENT
Start: 2020-01-18 | End: 2020-01-18 | Stop reason: HOSPADM

## 2020-01-18 RX ORDER — DIPHENHYDRAMINE HYDROCHLORIDE 50 MG/ML
12.5 INJECTION INTRAMUSCULAR; INTRAVENOUS EVERY 4 HOURS PRN
Status: DISCONTINUED | OUTPATIENT
Start: 2020-01-18 | End: 2020-01-23

## 2020-01-18 RX ORDER — IBUPROFEN 600 MG/1
600 TABLET ORAL ONCE AS NEEDED
Status: DISCONTINUED | OUTPATIENT
Start: 2020-01-18 | End: 2020-01-18

## 2020-01-18 RX ADMIN — PHENYLEPHRINE HCL 100 MCG: 10 MG/ML VIAL (ML) INJECTION at 09:38:00

## 2020-01-18 RX ADMIN — CEFAZOLIN SODIUM/WATER 2 G: 2 G/20 ML SYRINGE (ML) INTRAVENOUS at 09:40:00

## 2020-01-18 RX ADMIN — PHENYLEPHRINE HCL 100 MCG: 10 MG/ML VIAL (ML) INJECTION at 09:41:00

## 2020-01-18 RX ADMIN — PHENYLEPHRINE HCL 100 MCG: 10 MG/ML VIAL (ML) INJECTION at 09:52:00

## 2020-01-18 RX ADMIN — BUPIVACAINE HYDROCHLORIDE 1.8 ML: 7.5 INJECTION, SOLUTION INTRASPINAL at 09:34:00

## 2020-01-18 RX ADMIN — SODIUM CHLORIDE, SODIUM LACTATE, POTASSIUM CHLORIDE, CALCIUM CHLORIDE: 600; 310; 30; 20 INJECTION, SOLUTION INTRAVENOUS at 10:30:00

## 2020-01-18 RX ADMIN — MORPHINE SULFATE 0.2 MG: 2 INJECTION, SOLUTION INTRAMUSCULAR; INTRAVENOUS at 09:34:00

## 2020-01-18 RX ADMIN — ONDANSETRON 4 MG: 2 INJECTION INTRAMUSCULAR; INTRAVENOUS at 09:50:00

## 2020-01-18 NOTE — L&D DELIVERY NOTE
Chano Boateng [[id number]]    Labor Events     labor?:  No   steroids?:  Full Course  Antibiotics received during labor?:  Yes  Antibiotics (enter # doses in comment):  ampicillin, other  Rupture date/time:  2020 0045     Rupture t Method:  Suctioning, Oxygen, PPV     [place] Measurements    Weight:  2090 g 4 lb 9.7 oz                  Placenta    Date/time:  2020 0950  Removal:  Manual Removal  Appearance:  Intact  Disposition:  Pathology     Apgars    Living status:  Living

## 2020-01-18 NOTE — PROGRESS NOTES
Consents explained to the pt and then signed by her. Vs taken and posted. Pt denies any pain. Rest encouraged. Safety precautions in effect.   Call bell within easy reach

## 2020-01-18 NOTE — OPERATIVE REPORT
[facility]   Section - Operative Note    Shira Quevedo Patient Status:  Inpatient    1990 MRN [id number]   Location 1818 [facility] Attending Joo Roman, [address] [facility] Day # 0 PCP Katharine Roman MD     Da coronary sinus with possibly continuation of the left SVC,borderline measurements of aortic valve annulus and ascending aorta and low suspicion for subaortic VSD.     MFM US 35 wk - EFW 7%, AC< 5%, LOAN 12.4 cm, normal UAD, BPP 8/8. She had some decels at 39 a scalpel and blunt finger dissection. Amniotomy was performed with clear fluid. The fetus was delivered from a vertex presentation. Delayed cord clamping was performed. The cord was clamped and cut.  The infant was placed in the radiant warmer with Neonato

## 2020-01-18 NOTE — PROGRESS NOTES
Received pt from RN in stable condition. Plan of care discussed with pt. Support person at bedside. Baby warmer checked. All mechanics working properly.

## 2020-01-18 NOTE — H&P
[address] [place] [address] Patient Status:  Inpatient    1990 MRN [id number]   Location 1818 [facility] Attending Lou Polanco, [address] [facility] Day # 0 PCP Dianelys Swain MD     Date of [address] Live Births   0 0 0 0 0      # Outcome Date GA Lbr Carlos Manuel/2nd Weight Sex Delivery Anes PTL Lv   1 Current              Past Medical History:   Past Medical History:   Diagnosis Date   • ASCUS with positive high risk HPV cervical 01/21/2017 01/03/2015 1/25/ time  Prenatal Vit-Fe Sulfate-FA (PRENATAL VITAMIN OR), Take by mouth., Disp: , Rfl: , 1/17/2020 at Unknown time  Omega-3 Fatty Acids (FISH OIL CONCENTRATE OR), Take 2 capsules by mouth daily.   , Disp: , Rfl: , 1/17/2020 at Unknown time  Ascorbic Acid (VIT COLORUR Yellow 10/23/2019    CLARITY Clear 10/23/2019    SPECGRAVITY 1.025 10/23/2019    PROUR Negative 10/23/2019    GLUUR neg 01/15/2020    KETUR 20  (A) 10/23/2019    BILUR Negative 10/23/2019    BLOODURINE Negative 10/23/2019    NITRITE Negative 10/23/

## 2020-01-18 NOTE — PLAN OF CARE
Dr. March Adjutant called this RN. RN to discuss starting pitocin now with patient versus doing a . RN explained to patient the concerns of baby not tolerating labor. Pt would like to try pitocin first. Dr. March Adjutant notified.  Orders for  pitocin rec'd

## 2020-01-18 NOTE — PROGRESS NOTES
OB attending    Notified by RN while I was finishing a case up in the main OR that there was a prolonged fetal heart rate deceleration at 0734 from 150 moderate variability down to 90s for 10-11 minutes.  IV oxytocin had gotten up to 4 milliunits/min and wa clear fluid. She and FOB would like some time to discuss things.      Will restart IV oxytocin while patient is considering things, to prove to her that we are continuing to make an attempt at this time towards vaginal delivery, but that decelerations will

## 2020-01-18 NOTE — ANESTHESIA PREPROCEDURE EVALUATION
PRE-OP EVALUATION    Patient Name: Myriam Tidwell    Pre-op Diagnosis: * No pre-op diagnosis entered *    Procedure(s):      Surgeon(s) and Role:     Becca Garsia MD - Primary     * Alexa Torres MD - Assisting Surgeon    Pre-op vitals review mouth daily. , Disp: , Rfl:   Ascorbic Acid (VITAMIN C OR), Take 1 tablet by mouth daily. , Disp: , Rfl:   Multiple Vitamins-Calcium (TGT DAILY MULTIVITAMIN WOMENS) Oral Tab, Take 1 tablet by mouth daily. , Disp: , Rfl:         Allergies: Dairy Products;

## 2020-01-18 NOTE — ANESTHESIA PROCEDURE NOTES
Spinal Block  Performed by: Jose Alfredo Beth MD  Authorized by: Jose Alfredo Beth MD       General Information and Staff    Start Time:   Anesthesiologist: Jose Alfredo Beth MD  Performed by:   Anesthesiologist  Patient Location:  OB  Site identificatio

## 2020-01-18 NOTE — PLAN OF CARE
Problem: BIRTH - VAGINAL/ SECTION  Goal: Fetal and maternal status remain reassuring during the birth process  Description  INTERVENTIONS:  - Monitor vital signs  - Monitor fetal heart rate  - Monitor uterine activity  - Monitor labor progression fetal condition is stable  Description  INTERVENTIONS:  - Maternal surveillance  - Fetal surveillance  - Monitor uterine activity  - Medications as ordered  - Bedrest  1/18/2020 1105 by Huong Jeffrey RN  Outcome: Completed  1/18/2020 0826 by Huong Jeffrey, tolerated  EFM per protocol  Maintain IV as ordered  Antibiotics as needed per protocol  Informed consent  Interventions:  - See additional Care Plan goals for specific interventions   1/18/2020 1105 by Jaciel Tong RN  Outcome: Completed  1/18/2020 8938

## 2020-01-18 NOTE — ANESTHESIA POSTPROCEDURE EVALUATION
Aileen Grant [id number] Patient Status:  Inpatient   Age/Gender 31year old female MRN [id number]   Location 1818 [facility] Attending Rich Walls, 160[address] [facility] Day # 0 PCP [facility], MD       Anesthesia Post-op

## 2020-01-18 NOTE — PROGRESS NOTES
Pt is a 31year old female admitted to TRG2/TRG2-A. Patient presents with:  Srom: Patient here with c/o leaking @ 0045 of clear fluid. Patient denies regular painful contractions or vaginal bleeding. +fetal movement.      Pt is  37w0d intra-uterine

## 2020-01-18 NOTE — PROGRESS NOTES
NURSING ADMISSION NOTE      Patient admitted via cart. Oriented to room. Safety precautions initiated. Bed in low position. Call light in reach. IV infusing on pump, snyder to gravity, SCDS being applied.

## 2020-01-18 NOTE — PROGRESS NOTES
OB attending    Patient notified RN and me that she had decided that she would like to proceed with  section at this time. She expressed that she just wants what is \"safest for the baby. \" I expressed that I agree with her.  Luckily her fetal bradford

## 2020-01-19 LAB
BASOPHILS # BLD AUTO: 0.04 X10(3) UL (ref 0–0.2)
BASOPHILS NFR BLD AUTO: 0.2 %
DEPRECATED RDW RBC AUTO: 46 FL (ref 35.1–46.3)
EOSINOPHIL # BLD AUTO: 0.02 X10(3) UL (ref 0–0.7)
EOSINOPHIL NFR BLD AUTO: 0.1 %
ERYTHROCYTE [DISTWIDTH] IN BLOOD BY AUTOMATED COUNT: 12.9 % (ref 11–15)
HCT VFR BLD AUTO: 35 % (ref 35–48)
HGB BLD-MCNC: 11.5 G/DL (ref 12–16)
IMM GRANULOCYTES # BLD AUTO: 0.44 X10(3) UL (ref 0–1)
IMM GRANULOCYTES NFR BLD: 2.5 %
LYMPHOCYTES # BLD AUTO: 2.66 X10(3) UL (ref 1–4)
LYMPHOCYTES NFR BLD AUTO: 15.4 %
MCH RBC QN AUTO: 32.1 PG (ref 26–34)
MCHC RBC AUTO-ENTMCNC: 32.9 G/DL (ref 31–37)
MCV RBC AUTO: 97.8 FL (ref 80–100)
MONOCYTES # BLD AUTO: 1.1 X10(3) UL (ref 0.1–1)
MONOCYTES NFR BLD AUTO: 6.4 %
NEUTROPHILS # BLD AUTO: 13.01 X10 (3) UL (ref 1.5–7.7)
NEUTROPHILS # BLD AUTO: 13.01 X10(3) UL (ref 1.5–7.7)
NEUTROPHILS NFR BLD AUTO: 75.4 %
PLATELET # BLD AUTO: 176 10(3)UL (ref 150–450)
RBC # BLD AUTO: 3.58 X10(6)UL (ref 3.8–5.3)
WBC # BLD AUTO: 17.3 X10(3) UL (ref 4–11)

## 2020-01-19 NOTE — PROGRESS NOTES
Up to bathroom and voided, tolerated being up well, back to sit in chair. Wishes to eat something and go to NICU to see baby, defers assessment until she is back, will order breakfast then and ask for pain medication.  Given some food and juice from nourish

## 2020-01-19 NOTE — PROGRESS NOTES
[facility] 2SW-J n    [address] Patient Status:  Inpatient   Age/Gender 31year old female MRN [id number]   [facility] 2SW-J Attending Fausto Oliveira, [address] [facility] [address] Day # 1 PCP Quin Claude, MD      Anesthesia Pa

## 2020-01-19 NOTE — PROGRESS NOTES
In wheelchair to go to NICU. States discomfort in crease of L leg where leg meets body, occasionally hard to walk. Wants to see baby, will notify me when back to be checked. Advised not to get up without calling for help, states understanding.  Accompanied

## 2020-01-19 NOTE — PROGRESS NOTES
Vizcaino Laureano had pericare in bed, wishes to rest until  is back and will get up then to go NICU w/ him to see baby. Pericare done by Lois CHU.

## 2020-01-19 NOTE — PROGRESS NOTES
OB progress note    A/P: 31year old  now POD#1 s/p PLTCS at 37w0d for fetal heart rate decelerations remote from delivery after presenting with PROM, pregnancy complicated by IUGR, fetal renal pyelectasis, suspected fetal cardiac anomaly, infant kareem

## 2020-01-19 NOTE — PLAN OF CARE
Problem: POSTPARTUM  Goal: Optimize infant feeding at the breast  Description  INTERVENTIONS:  - Initiate breast feeding within first hour after birth. - Monitor effectiveness of current breast feeding efforts.   - Assess support systems available to mo function and monitor for bladder distention.  - Provide/instruct/assist with pericare as needed. - Provide VTE prophylaxis as needed. - Monitor bowel function.  - Encourage ambulation and provide assistance as needed.   - Assess and monitor emotional stat

## 2020-01-20 NOTE — PROGRESS NOTES
Postpartum Progress Note    SUBJECTIVE:    Post-op day #2 s/p   section. No overnight events. No complaints. Ambulating, tolerating PO, voiding,  passing flatus. breastfeeding.   pumping    OBJECTIVE:    Vital signs in last 24 hours:  Temp:  [

## 2020-01-20 NOTE — CM/SW NOTE
PELON met with Jos Linares and her , Yovani June to review insurance and PCP for infant. Infant will be added to [employer], which is the plan that Tara delivered under.  CM reviewed insurance Auth process and ask that Yovani Watkins call h

## 2020-01-21 NOTE — PROGRESS NOTES
[facility]  Post-Partum Caesarean Section Progress Note    Zofia Reynolds Patient Status:  Inpatient    1990 MRN [id number]   [facility] 2SW-J Attending Dwight Zuniga, [address] [facility] Day # 3 PCP Luisa Quevedo MD     SUB

## 2020-01-21 NOTE — PLAN OF CARE
Doing well, plans to stay on Norco/Motrin schedule, had normal bowel movement, visits NICU, baby doing better, off vent, on CPAP

## 2020-01-21 NOTE — PAYOR COMM NOTE
--------------  WE ARE STILL AWAITING YOUR DETERMINATION ON THIS INPT ADMISSION.     PLEASE FAX INPT DAYS AUTHORIZED ASAP -[phone]    [facility] YOU    DISCHARGE REVIEW    Payor: Kayla Coburn #:  [id number]  Authorization Number: [id number]

## 2020-01-22 VITALS
BODY MASS INDEX: 24.99 KG/M2 | DIASTOLIC BLOOD PRESSURE: 75 MMHG | HEART RATE: 88 BPM | SYSTOLIC BLOOD PRESSURE: 120 MMHG | OXYGEN SATURATION: 100 % | HEIGHT: 65 IN | RESPIRATION RATE: 18 BRPM | TEMPERATURE: 99 F | WEIGHT: 150 LBS

## 2020-01-22 RX ORDER — HYDROCODONE BITARTRATE AND ACETAMINOPHEN 5; 325 MG/1; MG/1
1 TABLET ORAL EVERY 4 HOURS PRN
Qty: 20 TABLET | Refills: 0 | Status: SHIPPED | OUTPATIENT
Start: 2020-01-22 | End: 2020-02-17 | Stop reason: ALTCHOICE

## 2020-01-23 NOTE — DISCHARGE SUMMARY
[facility]    PATIENT'S NAME: [facility]   ATTENDING PHYSICIAN: Dano Prince D.O.   PATIENT ACCOUNT#:   [id number]    LOCATION:  [address] [place] [address]  MEDICAL RECORD #:   [id number]       YOB: 1990  ADMISSION DATE:       01/1

## 2020-01-24 ENCOUNTER — TELEPHONE (OUTPATIENT)
Dept: OBGYN UNIT | Facility: HOSPITAL | Age: 30
End: 2020-01-24

## 2020-01-24 NOTE — PROGRESS NOTES
Reviewed self care w / mom, she verbalizes understanding of instructions reviewed. Encourage to follow up w/ MDs as directed and w/ questions/concerns. Infant remains in nicu, doing well, pumping well. Enc to go to ct.

## 2020-01-29 ENCOUNTER — TELEPHONE (OUTPATIENT)
Dept: OBGYN CLINIC | Facility: CLINIC | Age: 30
End: 2020-01-29

## 2020-01-29 ENCOUNTER — NURSE ONLY (OUTPATIENT)
Dept: OBGYN CLINIC | Facility: CLINIC | Age: 30
End: 2020-01-29
Payer: COMMERCIAL

## 2020-01-29 VITALS — SYSTOLIC BLOOD PRESSURE: 126 MMHG | DIASTOLIC BLOOD PRESSURE: 68 MMHG

## 2020-01-29 NOTE — TELEPHONE ENCOUNTER
Patient is 10 days post partum, c/o swelling to lower extremities and headaches. Patient denies visual disturbance. No blood pressure cuff at home.  Patient instructed to keep her lower extremities elevated, increase oral fluid intake and come to office for

## 2020-02-17 ENCOUNTER — POSTPARTUM (OUTPATIENT)
Dept: OBGYN CLINIC | Facility: CLINIC | Age: 30
End: 2020-02-17
Payer: COMMERCIAL

## 2020-02-17 VITALS
WEIGHT: 144 LBS | HEIGHT: 65 IN | BODY MASS INDEX: 23.99 KG/M2 | SYSTOLIC BLOOD PRESSURE: 102 MMHG | DIASTOLIC BLOOD PRESSURE: 64 MMHG | HEART RATE: 86 BPM

## 2020-02-17 DIAGNOSIS — Z98.891 STATUS POST PRIMARY LOW TRANSVERSE CESAREAN SECTION: ICD-10-CM

## 2020-02-17 DIAGNOSIS — R10.31 ABDOMINAL WALL PAIN IN RIGHT LOWER QUADRANT: ICD-10-CM

## 2020-02-17 DIAGNOSIS — M62.89 PELVIC FLOOR DYSFUNCTION: ICD-10-CM

## 2020-02-17 DIAGNOSIS — N71.1 CHRONIC ENDOMETRITIS: ICD-10-CM

## 2020-02-17 DIAGNOSIS — Z30.09 GENERAL COUNSELING AND ADVICE FOR CONTRACEPTIVE MANAGEMENT: ICD-10-CM

## 2020-02-17 PROBLEM — O35.EXX0 FETAL HYDRONEPHROSIS DURING PREGNANCY, ANTEPARTUM: Status: RESOLVED | Noted: 2019-09-30 | Resolved: 2020-02-17

## 2020-02-17 PROBLEM — Z34.90 PREGNANCY: Status: RESOLVED | Noted: 2020-01-15 | Resolved: 2020-02-17

## 2020-02-17 PROBLEM — O35.BXX0 FETAL CARDIAC ANOMALY COMPLICATING PREGNANCY, ANTEPARTUM: Status: RESOLVED | Noted: 2019-10-23 | Resolved: 2020-02-17

## 2020-02-17 PROBLEM — O36.8390 FETAL HEART RATE DECELERATIONS AFFECTING MANAGEMENT OF MOTHER: Status: RESOLVED | Noted: 2020-01-15 | Resolved: 2020-02-17

## 2020-02-17 PROBLEM — Z22.330 GBS CARRIER: Status: RESOLVED | Noted: 2020-01-15 | Resolved: 2020-02-17

## 2020-02-17 PROBLEM — O35.8XX0 PYELECTASIS OF FETUS ON PRENATAL ULTRASOUND: Status: RESOLVED | Noted: 2019-10-23 | Resolved: 2020-02-17

## 2020-02-17 PROBLEM — O42.90 PROM (PREMATURE RUPTURE OF MEMBRANES): Status: RESOLVED | Noted: 2020-01-18 | Resolved: 2020-02-17

## 2020-02-17 PROBLEM — O99.810 ABNORMAL MATERNAL GLUCOSE TOLERANCE, ANTEPARTUM: Status: RESOLVED | Noted: 2019-10-23 | Resolved: 2020-02-17

## 2020-02-17 PROBLEM — O35.8XX0 FETAL HYDRONEPHROSIS DURING PREGNANCY, ANTEPARTUM: Status: RESOLVED | Noted: 2019-09-30 | Resolved: 2020-02-17

## 2020-02-17 PROBLEM — O26.899 DIARRHEA DURING PREGNANCY: Status: RESOLVED | Noted: 2019-08-28 | Resolved: 2020-02-17

## 2020-02-17 PROBLEM — O36.5930 POOR FETAL GROWTH AFFECTING MANAGEMENT OF MOTHER IN THIRD TRIMESTER: Status: RESOLVED | Noted: 2020-01-06 | Resolved: 2020-02-17

## 2020-02-17 PROBLEM — R19.7 DIARRHEA DURING PREGNANCY: Status: RESOLVED | Noted: 2019-08-28 | Resolved: 2020-02-17

## 2020-02-17 PROBLEM — O35.8XX0 FETAL CARDIAC ANOMALY COMPLICATING PREGNANCY, ANTEPARTUM: Status: RESOLVED | Noted: 2019-10-23 | Resolved: 2020-02-17

## 2020-02-17 PROBLEM — O35.EXX0 PYELECTASIS OF FETUS ON PRENATAL ULTRASOUND: Status: RESOLVED | Noted: 2019-10-23 | Resolved: 2020-02-17

## 2020-02-17 RX ORDER — DOXYCYCLINE HYCLATE 100 MG
100 TABLET ORAL 2 TIMES DAILY
Qty: 28 TABLET | Refills: 0 | Status: SHIPPED | OUTPATIENT
Start: 2020-02-17 | End: 2020-03-02

## 2020-02-17 NOTE — PROGRESS NOTES
[facility]  Obstetrics and Gynecology   Postpartum Progress Note    CC: Patient presents with:  Postpartum Care: pp check boy 2020 c-sec breastfeeding       Subjective:     Ioana Chavira is a 31year old  female who is 4 wk s/p PTL Lv   1 Term 01/18/20 37w0d  4 lb 9.7 oz (2.09 kg) M Caesarean Spinal N GONSALO      Birth Comments: see NICU notes      Complications: Group B beta strep +      Obstetric Comments   1/2020: \"Sacha\" Infant - male   Microarray: interstitial deletion (1 co LAPAROSCOPIC CHOLECYSTECTOMY  02/13/2019   • LAPAROSCOPIC CHOLECYSTECTOMY N/A 2/13/2019    Performed by Hank Barboza MD at [address] [facility] OR   • WISDOM TEETH REMOVED         Allergies:    Dairy Products          OTHER (SEE COMMENTS)    Comment:Lactose intolera Pulse: 86   Weight: 144 lb (65.3 kg)   Height: 65\"         Body mass index is 23.96 kg/m².     General: AAO.NAD.   CVS exam: normal rate, regular rhythm, normal S1, S2, no murmurs, rubs, clicks or gallops  Chest: clear to auscultation, no wheezes, rales Diagnoses and all orders for this visit:    Encounter for postpartum visit    Status post primary low transverse  section  -     OP REFERRAL TO [facility]    Abdominal wall pain in right lower quadrant    Pelvic floor dysfu

## 2020-02-17 NOTE — PATIENT INSTRUCTIONS
If taking doxycycline - pump & dump during therapy & 24 hr after last dose    If still bleeding at 6+ weeks - take urine pregnancy test & call us.      Pap was negative 7/15/19

## 2020-03-11 ENCOUNTER — OCC HEALTH (OUTPATIENT)
Dept: OTHER | Facility: HOSPITAL | Age: 30
End: 2020-03-11
Attending: PREVENTIVE MEDICINE

## 2020-03-11 DIAGNOSIS — Z01.84 IMMUNITY STATUS TESTING: Primary | ICD-10-CM

## 2020-03-11 LAB
HBV SURFACE AG SER-ACNC: <0.1 [IU]/L
HBV SURFACE AG SERPL QL IA: NONREACTIVE
HCV AB SERPL QL IA: NONREACTIVE
HIV 1+2 AB+HIV1 P24 AG SERPL QL IA: NONREACTIVE

## 2020-03-11 PROCEDURE — 86701 HIV-1ANTIBODY: CPT

## 2020-03-11 PROCEDURE — 86803 HEPATITIS C AB TEST: CPT

## 2020-03-11 PROCEDURE — 87340 HEPATITIS B SURFACE AG IA: CPT

## 2020-07-17 ENCOUNTER — OFFICE VISIT (OUTPATIENT)
Dept: OBGYN CLINIC | Facility: CLINIC | Age: 30
End: 2020-07-17
Payer: COMMERCIAL

## 2020-07-17 VITALS
DIASTOLIC BLOOD PRESSURE: 56 MMHG | BODY MASS INDEX: 22.16 KG/M2 | WEIGHT: 133 LBS | HEART RATE: 64 BPM | HEIGHT: 65 IN | SYSTOLIC BLOOD PRESSURE: 102 MMHG

## 2020-07-17 DIAGNOSIS — N76.0 VAGINITIS AND VULVOVAGINITIS: ICD-10-CM

## 2020-07-17 DIAGNOSIS — Z30.09 GENERAL COUNSELING AND ADVICE FOR CONTRACEPTIVE MANAGEMENT: ICD-10-CM

## 2020-07-17 DIAGNOSIS — Z01.419 WELL WOMAN EXAM WITH ROUTINE GYNECOLOGICAL EXAM: Primary | ICD-10-CM

## 2020-07-17 DIAGNOSIS — Z12.4 SCREENING FOR MALIGNANT NEOPLASM OF CERVIX: ICD-10-CM

## 2020-07-17 PROCEDURE — 88175 CYTOPATH C/V AUTO FLUID REDO: CPT | Performed by: OBSTETRICS & GYNECOLOGY

## 2020-07-17 PROCEDURE — 99395 PREV VISIT EST AGE 18-39: CPT | Performed by: OBSTETRICS & GYNECOLOGY

## 2020-07-17 PROCEDURE — 87510 GARDNER VAG DNA DIR PROBE: CPT | Performed by: OBSTETRICS & GYNECOLOGY

## 2020-07-17 PROCEDURE — 3008F BODY MASS INDEX DOCD: CPT | Performed by: OBSTETRICS & GYNECOLOGY

## 2020-07-17 PROCEDURE — 3078F DIAST BP <80 MM HG: CPT | Performed by: OBSTETRICS & GYNECOLOGY

## 2020-07-17 PROCEDURE — 3074F SYST BP LT 130 MM HG: CPT | Performed by: OBSTETRICS & GYNECOLOGY

## 2020-07-17 PROCEDURE — 87660 TRICHOMONAS VAGIN DIR PROBE: CPT | Performed by: OBSTETRICS & GYNECOLOGY

## 2020-07-17 PROCEDURE — 87480 CANDIDA DNA DIR PROBE: CPT | Performed by: OBSTETRICS & GYNECOLOGY

## 2020-07-17 RX ORDER — METRONIDAZOLE 7.5 MG/G
1 GEL VAGINAL NIGHTLY
Qty: 1 TUBE | Refills: 0 | Status: SHIPPED | OUTPATIENT
Start: 2020-07-17 | End: 2020-07-22

## 2020-07-17 NOTE — PATIENT INSTRUCTIONS
Lubricants    Aloe Cadabra  Good Clean Love  Pre-Seed (targeted for those attempting to conceive)   Restore    *Lubricants that are hypo-osmolar or iso-osmolar are preferable to hyper-osmolar formulations. Coconut oil.

## 2020-07-17 NOTE — PROGRESS NOTES
[facility]  Obstetrics and Gynecology    CC: Patient presents with:  Wellness Visit: pt c/o vaginal itching. No discharge or odor.  pt said she has latex allergy and not sure if its due to condoms     Subjective:     Kedar Santanas is a 31 ye Did not take the doxycycline. Patient's last menstrual period was 2020.    H/o intense cramping & long periods prior to pregnancy  Pap 19 negative  HPV vaccine - did receive     Review of Systems: per HPI     OB History    Para Ter helped.     • Palpitations 2016 Echo LVEF 70-75%, normal.    • Pleurisy 2016,    • Pneumonia of right middle lobe due to infectious organism 2016     Past Surgical History:   Procedure Laterality Date   •   2020 Seat Belt: Yes      Family History   Problem Relation Age of Onset   • Breast Cancer Mother 36   • Hypertension Father    • Other (Kidney disease) Father    • No Known Problems Maternal Grandmother    • No Known Problems Maternal Grandfather    • No Kn REQUEST B; Future    Other orders  -     metRONIDAZOLE 0.75 % Vaginal Gel; Place 1 Applicatorful vaginally nightly for 5 days. No alcohol during treatment and for 24 hr after last dose.          Plan:     Vaginitis  -exam today with mild vaginal atrophy, di

## 2021-04-13 ENCOUNTER — INITIAL PRENATAL (OUTPATIENT)
Dept: OBGYN CLINIC | Facility: CLINIC | Age: 31
End: 2021-04-13
Payer: COMMERCIAL

## 2021-04-13 ENCOUNTER — ULTRASOUND ENCOUNTER (OUTPATIENT)
Dept: OBGYN CLINIC | Facility: CLINIC | Age: 31
End: 2021-04-13
Payer: COMMERCIAL

## 2021-04-13 VITALS
WEIGHT: 138 LBS | BODY MASS INDEX: 22.99 KG/M2 | SYSTOLIC BLOOD PRESSURE: 102 MMHG | HEART RATE: 85 BPM | HEIGHT: 65 IN | DIASTOLIC BLOOD PRESSURE: 73 MMHG

## 2021-04-13 DIAGNOSIS — Z84.89 FAMILY HISTORY OF GENETIC DISORDER: ICD-10-CM

## 2021-04-13 DIAGNOSIS — O36.80X0 PREGNANCY WITH INCONCLUSIVE FETAL VIABILITY, SINGLE OR UNSPECIFIED FETUS: Primary | ICD-10-CM

## 2021-04-13 DIAGNOSIS — Z98.891 STATUS POST PRIMARY LOW TRANSVERSE CESAREAN SECTION: ICD-10-CM

## 2021-04-13 DIAGNOSIS — Z34.81 PRENATAL CARE, SUBSEQUENT PREGNANCY, FIRST TRIMESTER: Primary | ICD-10-CM

## 2021-04-13 DIAGNOSIS — O36.80X0 PREGNANCY WITH INCONCLUSIVE FETAL VIABILITY, SINGLE OR UNSPECIFIED FETUS: ICD-10-CM

## 2021-04-13 PROCEDURE — 87086 URINE CULTURE/COLONY COUNT: CPT | Performed by: OBSTETRICS & GYNECOLOGY

## 2021-04-13 PROCEDURE — 87591 N.GONORRHOEAE DNA AMP PROB: CPT | Performed by: OBSTETRICS & GYNECOLOGY

## 2021-04-13 PROCEDURE — 87491 CHLMYD TRACH DNA AMP PROBE: CPT | Performed by: OBSTETRICS & GYNECOLOGY

## 2021-04-13 PROCEDURE — 87077 CULTURE AEROBIC IDENTIFY: CPT | Performed by: OBSTETRICS & GYNECOLOGY

## 2021-04-13 PROCEDURE — 3074F SYST BP LT 130 MM HG: CPT | Performed by: OBSTETRICS & GYNECOLOGY

## 2021-04-13 PROCEDURE — 76801 OB US < 14 WKS SINGLE FETUS: CPT | Performed by: OBSTETRICS & GYNECOLOGY

## 2021-04-13 PROCEDURE — 3078F DIAST BP <80 MM HG: CPT | Performed by: OBSTETRICS & GYNECOLOGY

## 2021-04-13 PROCEDURE — 81002 URINALYSIS NONAUTO W/O SCOPE: CPT | Performed by: OBSTETRICS & GYNECOLOGY

## 2021-04-13 PROCEDURE — 3008F BODY MASS INDEX DOCD: CPT | Performed by: OBSTETRICS & GYNECOLOGY

## 2021-04-13 NOTE — PROGRESS NOTES
transabdominal us performed  single viable iup at 9w2d  s=d  azz=471 bpm  rt ovarian cyst 2.6 x 2.4 cm  left ovary appears norm and has a genetic abnormality see OB history and physical.  The parents have been tested and are both negative.   They have seen

## 2021-04-16 RX ORDER — AMPICILLIN 500 MG/1
500 CAPSULE ORAL 3 TIMES DAILY
Qty: 15 CAPSULE | Refills: 0 | Status: SHIPPED | OUTPATIENT
Start: 2021-04-16 | End: 2021-04-21

## 2021-05-03 ENCOUNTER — ROUTINE PRENATAL (OUTPATIENT)
Dept: OBGYN CLINIC | Facility: CLINIC | Age: 31
End: 2021-05-03
Payer: COMMERCIAL

## 2021-05-03 VITALS
WEIGHT: 139 LBS | BODY MASS INDEX: 23.16 KG/M2 | HEART RATE: 86 BPM | HEIGHT: 65 IN | SYSTOLIC BLOOD PRESSURE: 102 MMHG | DIASTOLIC BLOOD PRESSURE: 62 MMHG

## 2021-05-03 DIAGNOSIS — Z34.81 PRENATAL CARE, SUBSEQUENT PREGNANCY, FIRST TRIMESTER: Primary | ICD-10-CM

## 2021-05-03 PROBLEM — M62.89 PELVIC FLOOR DYSFUNCTION: Status: RESOLVED | Noted: 2019-10-23 | Resolved: 2021-05-03

## 2021-05-03 PROBLEM — O99.820 GBS (GROUP B STREPTOCOCCUS CARRIER), +RV CULTURE, CURRENTLY PREGNANT: Status: ACTIVE | Noted: 2020-01-15

## 2021-05-03 PROBLEM — R10.31 ABDOMINAL WALL PAIN IN RIGHT LOWER QUADRANT: Status: RESOLVED | Noted: 2020-02-17 | Resolved: 2021-05-03

## 2021-05-03 PROBLEM — O99.891 DISORDER OF MUSCULOSKELETAL SYSTEM DURING PREGNANCY: Status: RESOLVED | Noted: 2019-10-23 | Resolved: 2021-05-03

## 2021-05-03 PROCEDURE — 3008F BODY MASS INDEX DOCD: CPT | Performed by: OBSTETRICS & GYNECOLOGY

## 2021-05-03 PROCEDURE — 81002 URINALYSIS NONAUTO W/O SCOPE: CPT | Performed by: OBSTETRICS & GYNECOLOGY

## 2021-05-03 PROCEDURE — 3078F DIAST BP <80 MM HG: CPT | Performed by: OBSTETRICS & GYNECOLOGY

## 2021-05-03 PROCEDURE — 3074F SYST BP LT 130 MM HG: CPT | Performed by: OBSTETRICS & GYNECOLOGY

## 2021-05-12 ENCOUNTER — TELEPHONE (OUTPATIENT)
Dept: OBGYN CLINIC | Facility: CLINIC | Age: 31
End: 2021-05-12

## 2021-05-14 NOTE — PROGRESS NOTES
Patient has no complaints  - reminded to have PNL done  - cfDNA , carrier screen done    1.  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

## 2021-05-26 ENCOUNTER — LAB ENCOUNTER (OUTPATIENT)
Dept: LAB | Facility: HOSPITAL | Age: 31
End: 2021-05-26
Attending: OBSTETRICS & GYNECOLOGY
Payer: COMMERCIAL

## 2021-05-26 DIAGNOSIS — O36.80X0 PREGNANCY WITH INCONCLUSIVE FETAL VIABILITY, SINGLE OR UNSPECIFIED FETUS: ICD-10-CM

## 2021-05-26 PROCEDURE — 85025 COMPLETE CBC W/AUTO DIFF WBC: CPT

## 2021-05-26 PROCEDURE — 86900 BLOOD TYPING SEROLOGIC ABO: CPT

## 2021-05-26 PROCEDURE — 86850 RBC ANTIBODY SCREEN: CPT

## 2021-05-26 PROCEDURE — 36415 COLL VENOUS BLD VENIPUNCTURE: CPT

## 2021-05-26 PROCEDURE — 86780 TREPONEMA PALLIDUM: CPT

## 2021-05-26 PROCEDURE — 86762 RUBELLA ANTIBODY: CPT

## 2021-05-26 PROCEDURE — 86901 BLOOD TYPING SEROLOGIC RH(D): CPT

## 2021-05-26 PROCEDURE — 87389 HIV-1 AG W/HIV-1&-2 AB AG IA: CPT

## 2021-05-26 PROCEDURE — 87340 HEPATITIS B SURFACE AG IA: CPT

## 2021-06-01 ENCOUNTER — ROUTINE PRENATAL (OUTPATIENT)
Dept: OBGYN CLINIC | Facility: CLINIC | Age: 31
End: 2021-06-01
Payer: COMMERCIAL

## 2021-06-01 VITALS
DIASTOLIC BLOOD PRESSURE: 60 MMHG | HEIGHT: 65 IN | SYSTOLIC BLOOD PRESSURE: 102 MMHG | HEART RATE: 90 BPM | WEIGHT: 140 LBS | BODY MASS INDEX: 23.32 KG/M2

## 2021-06-01 DIAGNOSIS — Z3A.16 16 WEEKS GESTATION OF PREGNANCY: ICD-10-CM

## 2021-06-01 DIAGNOSIS — Z34.82 PRENATAL CARE, SUBSEQUENT PREGNANCY, SECOND TRIMESTER: Primary | ICD-10-CM

## 2021-06-01 PROBLEM — Z14.8 GENETIC CARRIER: Status: ACTIVE | Noted: 2021-06-01

## 2021-06-01 PROCEDURE — 3008F BODY MASS INDEX DOCD: CPT | Performed by: OBSTETRICS & GYNECOLOGY

## 2021-06-01 PROCEDURE — 81002 URINALYSIS NONAUTO W/O SCOPE: CPT | Performed by: OBSTETRICS & GYNECOLOGY

## 2021-06-01 PROCEDURE — 3074F SYST BP LT 130 MM HG: CPT | Performed by: OBSTETRICS & GYNECOLOGY

## 2021-06-01 PROCEDURE — 3078F DIAST BP <80 MM HG: CPT | Performed by: OBSTETRICS & GYNECOLOGY

## 2021-06-01 NOTE — PROGRESS NOTES
Patient has no complaints  - cfDNA neg BOY     1. 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

## 2021-06-02 ENCOUNTER — TELEPHONE (OUTPATIENT)
Dept: OBGYN CLINIC | Facility: CLINIC | Age: 31
End: 2021-06-02

## 2021-06-25 ENCOUNTER — APPOINTMENT (OUTPATIENT)
Dept: OBGYN CLINIC | Facility: CLINIC | Age: 31
End: 2021-06-25
Payer: COMMERCIAL

## 2021-06-25 ENCOUNTER — TELEPHONE (OUTPATIENT)
Dept: OBGYN CLINIC | Facility: CLINIC | Age: 31
End: 2021-06-25

## 2021-06-25 ENCOUNTER — ROUTINE PRENATAL (OUTPATIENT)
Dept: OBGYN CLINIC | Facility: CLINIC | Age: 31
End: 2021-06-25
Payer: COMMERCIAL

## 2021-06-25 VITALS
DIASTOLIC BLOOD PRESSURE: 62 MMHG | SYSTOLIC BLOOD PRESSURE: 112 MMHG | HEIGHT: 65 IN | HEART RATE: 95 BPM | BODY MASS INDEX: 23.99 KG/M2 | WEIGHT: 144 LBS

## 2021-06-25 DIAGNOSIS — Z34.82 PRENATAL CARE, SUBSEQUENT PREGNANCY, SECOND TRIMESTER: Primary | ICD-10-CM

## 2021-06-25 DIAGNOSIS — Z98.891 STATUS POST PRIMARY LOW TRANSVERSE CESAREAN SECTION: ICD-10-CM

## 2021-06-25 DIAGNOSIS — Z36.89 ENCOUNTER FOR FETAL ANATOMIC SURVEY: Primary | ICD-10-CM

## 2021-06-25 LAB
GLUCOSE (URINE DIPSTICK): NEGATIVE MG/DL
MULTISTIX LOT#: NORMAL NUMERIC
PROTEIN (URINE DIPSTICK): NEGATIVE MG/DL

## 2021-06-25 PROCEDURE — 81002 URINALYSIS NONAUTO W/O SCOPE: CPT | Performed by: OBSTETRICS & GYNECOLOGY

## 2021-06-25 PROCEDURE — 3074F SYST BP LT 130 MM HG: CPT | Performed by: OBSTETRICS & GYNECOLOGY

## 2021-06-25 PROCEDURE — 3008F BODY MASS INDEX DOCD: CPT | Performed by: OBSTETRICS & GYNECOLOGY

## 2021-06-25 PROCEDURE — 3078F DIAST BP <80 MM HG: CPT | Performed by: OBSTETRICS & GYNECOLOGY

## 2021-06-25 NOTE — TELEPHONE ENCOUNTER
Patient filled out record release in office requesting all her prenatals.    Release signed, records handed to patient

## 2021-06-28 ENCOUNTER — TELEPHONE (OUTPATIENT)
Dept: OBGYN CLINIC | Facility: CLINIC | Age: 31
End: 2021-06-28

## 2021-06-28 NOTE — TELEPHONE ENCOUNTER
Spoke with Patient. She is aware of her  Julio Carrera carrier screen results. Results released to patient. Questions answered. Understanding verbalized.

## 2021-06-28 NOTE — PROGRESS NOTES
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

## 2021-07-17 ENCOUNTER — TELEPHONE (OUTPATIENT)
Dept: OBGYN CLINIC | Facility: CLINIC | Age: 31
End: 2021-07-17

## 2021-07-17 NOTE — TELEPHONE ENCOUNTER
Per pt the medical records we gave the pt were incomplete, she needs the full 20 wk us report with pictures & full report, and also the genetic & carrier screening for both her  & her. She wants all prenatal record complete. Please advise.  Thanks

## 2021-07-20 NOTE — TELEPHONE ENCOUNTER
Called pt and left a message to let her know that we have the records she requested inside an envelope in the . Per Conner Dotson RN, records were handed to me and they are ready to be picked.  Thanks

## 2021-07-27 ENCOUNTER — ROUTINE PRENATAL (OUTPATIENT)
Dept: OBGYN CLINIC | Facility: CLINIC | Age: 31
End: 2021-07-27
Payer: COMMERCIAL

## 2021-07-27 VITALS
DIASTOLIC BLOOD PRESSURE: 62 MMHG | HEART RATE: 70 BPM | SYSTOLIC BLOOD PRESSURE: 102 MMHG | HEIGHT: 65 IN | WEIGHT: 144 LBS | BODY MASS INDEX: 23.99 KG/M2

## 2021-07-27 DIAGNOSIS — Z34.82 PRENATAL CARE, SUBSEQUENT PREGNANCY, SECOND TRIMESTER: Primary | ICD-10-CM

## 2021-07-27 DIAGNOSIS — Z98.891 STATUS POST PRIMARY LOW TRANSVERSE CESAREAN SECTION: ICD-10-CM

## 2021-07-27 LAB
GLUCOSE (URINE DIPSTICK): NEGATIVE MG/DL
MULTISTIX LOT#: NORMAL NUMERIC

## 2021-07-27 PROCEDURE — 3078F DIAST BP <80 MM HG: CPT | Performed by: OBSTETRICS & GYNECOLOGY

## 2021-07-27 PROCEDURE — 3008F BODY MASS INDEX DOCD: CPT | Performed by: OBSTETRICS & GYNECOLOGY

## 2021-07-27 PROCEDURE — 3074F SYST BP LT 130 MM HG: CPT | Performed by: OBSTETRICS & GYNECOLOGY

## 2021-07-27 PROCEDURE — 81002 URINALYSIS NONAUTO W/O SCOPE: CPT | Performed by: OBSTETRICS & GYNECOLOGY

## 2021-07-27 NOTE — PROGRESS NOTES
We will do a genetic ultrasound every third for 4 weeks at [place] and will schedule her repeat  section she wishes a  if she goes into labor.

## 2021-08-04 ENCOUNTER — TELEPHONE (OUTPATIENT)
Dept: OBGYN CLINIC | Facility: CLINIC | Age: 31
End: 2021-08-04

## 2021-08-04 NOTE — TELEPHONE ENCOUNTER
Jodi Hawkins from [facility] called to clarify when patient should be seen for her growth US. Message send to Dr. Heike Craig to clarify.

## 2021-08-05 NOTE — TELEPHONE ENCOUNTER
Bonita Aguirre at [facility] notified that the [address] should be schedule at 28 weeks

## 2021-08-06 ENCOUNTER — TELEPHONE (OUTPATIENT)
Dept: OBGYN CLINIC | Facility: CLINIC | Age: 31
End: 2021-08-06

## 2021-08-06 ENCOUNTER — TELEPHONE (OUTPATIENT)
Dept: INTERNAL MEDICINE CLINIC | Facility: CLINIC | Age: 31
End: 2021-08-06

## 2021-08-06 DIAGNOSIS — J35.8 TONSIL ULCER: Primary | ICD-10-CM

## 2021-08-06 NOTE — TELEPHONE ENCOUNTER
Gargle with salt water, this is not likely strep  She is safe despite pg if it is strep, no treatment for viral.   Do COVID 19 test  Tylenol for pain    If develops fever, chills, any other acute illness signs, go to IC   Otherwise, if COVID negative and s

## 2021-08-06 NOTE — TELEPHONE ENCOUNTER
Spoke with patient, patient stated she had a white spot on her left tonsil. Patient stated she felt a sharp discomfort when she swallowed earlier today and is unsure if the white spot if from a chip or is strep.     Patient denies headache, fever, chills, s

## 2021-08-06 NOTE — TELEPHONE ENCOUNTER
Patient has one white dot in back of throat, and feels like it's dry to swollow. She called her PCP Dr. Ospina Quiet and the office said to call her OB?  Please advise

## 2021-08-06 NOTE — TELEPHONE ENCOUNTER
Spoke with patient, advised to contact pcp again. If they cannot get her in for evaluation patient advised to go to walk-In clinic if she wants to have throat looked at. Info given for [facility] walk in clinic. Understanding verbalized.

## 2021-08-17 ENCOUNTER — LAB ENCOUNTER (OUTPATIENT)
Dept: LAB | Facility: HOSPITAL | Age: 31
End: 2021-08-17
Attending: OBSTETRICS & GYNECOLOGY
Payer: COMMERCIAL

## 2021-08-17 DIAGNOSIS — Z34.82 PRENATAL CARE, SUBSEQUENT PREGNANCY, SECOND TRIMESTER: ICD-10-CM

## 2021-08-17 LAB
BASOPHILS # BLD AUTO: 0.04 X10(3) UL (ref 0–0.2)
BASOPHILS NFR BLD AUTO: 0.3 %
EOSINOPHIL # BLD AUTO: 0.04 X10(3) UL (ref 0–0.7)
EOSINOPHIL NFR BLD AUTO: 0.3 %
ERYTHROCYTE [DISTWIDTH] IN BLOOD BY AUTOMATED COUNT: 12.7 %
GLUCOSE 1H P GLC SERPL-MCNC: 157 MG/DL
HCT VFR BLD AUTO: 40.3 %
HGB BLD-MCNC: 13.1 G/DL
IMM GRANULOCYTES # BLD AUTO: 0.25 X10(3) UL (ref 0–1)
IMM GRANULOCYTES NFR BLD: 2.2 %
LYMPHOCYTES # BLD AUTO: 2.59 X10(3) UL (ref 1–4)
LYMPHOCYTES NFR BLD AUTO: 22.6 %
MCH RBC QN AUTO: 31 PG (ref 26–34)
MCHC RBC AUTO-ENTMCNC: 32.5 G/DL (ref 31–37)
MCV RBC AUTO: 95.5 FL
MONOCYTES # BLD AUTO: 0.51 X10(3) UL (ref 0.1–1)
MONOCYTES NFR BLD AUTO: 4.5 %
NEUTROPHILS # BLD AUTO: 8.02 X10 (3) UL (ref 1.5–7.7)
NEUTROPHILS # BLD AUTO: 8.02 X10(3) UL (ref 1.5–7.7)
NEUTROPHILS NFR BLD AUTO: 70.1 %
PLATELET # BLD AUTO: 190 10(3)UL (ref 150–450)
RBC # BLD AUTO: 4.22 X10(6)UL
WBC # BLD AUTO: 11.5 X10(3) UL (ref 4–11)

## 2021-08-17 PROCEDURE — 36415 COLL VENOUS BLD VENIPUNCTURE: CPT

## 2021-08-17 PROCEDURE — 82950 GLUCOSE TEST: CPT

## 2021-08-17 PROCEDURE — 85025 COMPLETE CBC W/AUTO DIFF WBC: CPT

## 2021-08-18 ENCOUNTER — TELEPHONE (OUTPATIENT)
Dept: OBGYN CLINIC | Facility: CLINIC | Age: 31
End: 2021-08-18

## 2021-08-18 DIAGNOSIS — R73.09 GLUCOSE TOLERANCE TEST ABNORMAL: Primary | ICD-10-CM

## 2021-08-18 NOTE — PROGRESS NOTES
Patient aware of 1hr glucose results and recommendation to do 3hr gtt. Patient verbalizes understanding. Order placed and routed to provider for signature.

## 2021-08-19 ENCOUNTER — LABORATORY ENCOUNTER (OUTPATIENT)
Dept: LAB | Facility: HOSPITAL | Age: 31
End: 2021-08-19
Attending: STUDENT IN AN ORGANIZED HEALTH CARE EDUCATION/TRAINING PROGRAM
Payer: COMMERCIAL

## 2021-08-19 DIAGNOSIS — R73.09 GLUCOSE TOLERANCE TEST ABNORMAL: ICD-10-CM

## 2021-08-19 LAB
1 HR GLUCOSE GESTATIONAL: 170 MG/DL
GLUCOSE 1H P GLC SERPL-MCNC: 129 MG/DL
GLUCOSE 3H P GLC SERPL-MCNC: 101 MG/DL
GLUCOSE BLD-MCNC: 84 MG/DL (ref 70–99)
GLUCOSE P FAST SERPL-MCNC: 88 MG/DL

## 2021-08-19 PROCEDURE — 36415 COLL VENOUS BLD VENIPUNCTURE: CPT

## 2021-08-19 PROCEDURE — 82951 GLUCOSE TOLERANCE TEST (GTT): CPT

## 2021-08-19 PROCEDURE — 82962 GLUCOSE BLOOD TEST: CPT

## 2021-08-19 PROCEDURE — 82952 GTT-ADDED SAMPLES: CPT

## 2021-08-22 NOTE — PROGRESS NOTES
SAL - 28w1d   Feeling more aches and pains, some shortness of breath when walking up stairs, moved into [facility] and has to carry her 18-mo old up and down stairs (he does not walk).  No CP  They are moving to [facility] for [facility]

## 2021-08-23 ENCOUNTER — ROUTINE PRENATAL (OUTPATIENT)
Dept: OBGYN CLINIC | Facility: CLINIC | Age: 31
End: 2021-08-23
Payer: COMMERCIAL

## 2021-08-23 VITALS
HEIGHT: 65 IN | SYSTOLIC BLOOD PRESSURE: 102 MMHG | HEART RATE: 118 BPM | BODY MASS INDEX: 25.16 KG/M2 | WEIGHT: 151 LBS | DIASTOLIC BLOOD PRESSURE: 80 MMHG

## 2021-08-23 DIAGNOSIS — Z34.82 PRENATAL CARE, SUBSEQUENT PREGNANCY, SECOND TRIMESTER: ICD-10-CM

## 2021-08-23 DIAGNOSIS — Z34.80 ENCOUNTER FOR SUPERVISION OF OTHER NORMAL PREGNANCY, UNSPECIFIED TRIMESTER: Primary | ICD-10-CM

## 2021-08-23 LAB — MULTISTIX LOT#: ABNORMAL NUMERIC

## 2021-08-23 PROCEDURE — 81002 URINALYSIS NONAUTO W/O SCOPE: CPT | Performed by: STUDENT IN AN ORGANIZED HEALTH CARE EDUCATION/TRAINING PROGRAM

## 2021-08-23 PROCEDURE — 3079F DIAST BP 80-89 MM HG: CPT | Performed by: STUDENT IN AN ORGANIZED HEALTH CARE EDUCATION/TRAINING PROGRAM

## 2021-08-23 PROCEDURE — 3008F BODY MASS INDEX DOCD: CPT | Performed by: STUDENT IN AN ORGANIZED HEALTH CARE EDUCATION/TRAINING PROGRAM

## 2021-08-23 PROCEDURE — 3074F SYST BP LT 130 MM HG: CPT | Performed by: STUDENT IN AN ORGANIZED HEALTH CARE EDUCATION/TRAINING PROGRAM

## 2021-09-07 ENCOUNTER — TELEPHONE (OUTPATIENT)
Dept: OBGYN CLINIC | Facility: CLINIC | Age: 31
End: 2021-09-07

## 2021-09-07 ENCOUNTER — ROUTINE PRENATAL (OUTPATIENT)
Dept: OBGYN CLINIC | Facility: CLINIC | Age: 31
End: 2021-09-07
Payer: COMMERCIAL

## 2021-09-07 VITALS
HEIGHT: 65 IN | BODY MASS INDEX: 25.63 KG/M2 | DIASTOLIC BLOOD PRESSURE: 60 MMHG | HEART RATE: 81 BPM | SYSTOLIC BLOOD PRESSURE: 110 MMHG | WEIGHT: 153.81 LBS

## 2021-09-07 DIAGNOSIS — Z3A.30 30 WEEKS GESTATION OF PREGNANCY: ICD-10-CM

## 2021-09-07 DIAGNOSIS — Z34.93 ENCOUNTER FOR SUPERVISION OF NORMAL PREGNANCY IN THIRD TRIMESTER, UNSPECIFIED GRAVIDITY: Primary | ICD-10-CM

## 2021-09-07 PROCEDURE — 81002 URINALYSIS NONAUTO W/O SCOPE: CPT | Performed by: OBSTETRICS & GYNECOLOGY

## 2021-09-07 PROCEDURE — 3008F BODY MASS INDEX DOCD: CPT | Performed by: OBSTETRICS & GYNECOLOGY

## 2021-09-07 PROCEDURE — 3078F DIAST BP <80 MM HG: CPT | Performed by: OBSTETRICS & GYNECOLOGY

## 2021-09-07 PROCEDURE — 3074F SYST BP LT 130 MM HG: CPT | Performed by: OBSTETRICS & GYNECOLOGY

## 2021-09-07 NOTE — PROGRESS NOTES
Patient has no complaints, moving to [place] - does not know the date for ure  - cfDNA neg BOY   - normal anatomy US  - failed 1h GTT, 3h GTT normal  - Hgb 13.1 at 27wk  - HIV reminded, declines TDAP    1.  H/o c/s  -  discussed - wants  if goes in

## 2021-09-07 NOTE — TELEPHONE ENCOUNTER
Per pt's request a green bag was sent to [address] [place] [address] records department to get her Medical records [facility] [place], per pt's request. Thanks

## 2021-09-09 ENCOUNTER — TELEPHONE (OUTPATIENT)
Dept: OBGYN CLINIC | Facility: CLINIC | Age: 31
End: 2021-09-09

## 2021-09-09 NOTE — TELEPHONE ENCOUNTER
Patient confirmed with her insurance that a non-hospital grade breast pump is covered in conjunction with each birth. How does patient proceed to get the breast pump? Please advise. Thank you!

## 2021-09-22 ENCOUNTER — TELEPHONE (OUTPATIENT)
Dept: OBGYN CLINIC | Facility: CLINIC | Age: 31
End: 2021-09-22

## 2021-09-22 NOTE — TELEPHONE ENCOUNTER
Per Willa slade/ [facility] oos. for [id number] the 45D70 rule will apply. ref # Chace w @ 1:245PM.

## 2021-09-23 ENCOUNTER — ROUTINE PRENATAL (OUTPATIENT)
Dept: OBGYN CLINIC | Facility: CLINIC | Age: 31
End: 2021-09-23
Payer: COMMERCIAL

## 2021-09-23 ENCOUNTER — TELEPHONE (OUTPATIENT)
Dept: OBGYN CLINIC | Facility: CLINIC | Age: 31
End: 2021-09-23

## 2021-09-23 VITALS
DIASTOLIC BLOOD PRESSURE: 60 MMHG | SYSTOLIC BLOOD PRESSURE: 104 MMHG | HEART RATE: 96 BPM | BODY MASS INDEX: 25.93 KG/M2 | HEIGHT: 65 IN | WEIGHT: 155.63 LBS

## 2021-09-23 DIAGNOSIS — Z28.21 TETANUS, DIPHTHERIA, AND ACELLULAR PERTUSSIS (TDAP) VACCINATION DECLINED: ICD-10-CM

## 2021-09-23 DIAGNOSIS — O34.219 PREGNANCY WITH HISTORY OF CESAREAN SECTION, ANTEPARTUM: Primary | ICD-10-CM

## 2021-09-23 DIAGNOSIS — Z84.89 FAMILY HISTORY OF GENETIC DISORDER: ICD-10-CM

## 2021-09-23 DIAGNOSIS — Z14.8 GENETIC CARRIER: ICD-10-CM

## 2021-09-23 PROBLEM — O99.820 GBS (GROUP B STREPTOCOCCUS CARRIER), +RV CULTURE, CURRENTLY PREGNANT: Status: RESOLVED | Noted: 2020-01-15 | Resolved: 2021-09-23

## 2021-09-23 PROBLEM — N71.1 CHRONIC ENDOMETRITIS: Status: RESOLVED | Noted: 2020-02-17 | Resolved: 2021-09-23

## 2021-09-23 PROBLEM — Z98.891 STATUS POST PRIMARY LOW TRANSVERSE CESAREAN SECTION: Status: RESOLVED | Noted: 2020-01-18 | Resolved: 2021-09-23

## 2021-09-23 PROCEDURE — 3008F BODY MASS INDEX DOCD: CPT | Performed by: OBSTETRICS & GYNECOLOGY

## 2021-09-23 PROCEDURE — 3074F SYST BP LT 130 MM HG: CPT | Performed by: OBSTETRICS & GYNECOLOGY

## 2021-09-23 PROCEDURE — 81002 URINALYSIS NONAUTO W/O SCOPE: CPT | Performed by: OBSTETRICS & GYNECOLOGY

## 2021-09-23 PROCEDURE — 3078F DIAST BP <80 MM HG: CPT | Performed by: OBSTETRICS & GYNECOLOGY

## 2021-09-23 NOTE — TELEPHONE ENCOUNTER
Dr. Shailesh Davis and pt filled out order for insurance covered breast pumps. Signed order was faxed in with a copy of pt's insurance card.

## 2021-09-23 NOTE — PROGRESS NOTES
SLA  +FM. Occasional contractions - variable in intensity. No leaking fluid, vaginal bleeding, headache, vision changes, epigastric pain. Some gradual SOB if active and playing with son on the ground. Moving to [place].  got promoted.  Her son

## 2021-10-04 ENCOUNTER — ROUTINE PRENATAL (OUTPATIENT)
Dept: OBGYN CLINIC | Facility: CLINIC | Age: 31
End: 2021-10-04
Payer: COMMERCIAL

## 2021-10-04 VITALS
HEART RATE: 87 BPM | WEIGHT: 158.63 LBS | DIASTOLIC BLOOD PRESSURE: 76 MMHG | SYSTOLIC BLOOD PRESSURE: 102 MMHG | BODY MASS INDEX: 26.43 KG/M2 | HEIGHT: 65 IN

## 2021-10-04 DIAGNOSIS — Z34.93 ENCOUNTER FOR SUPERVISION OF NORMAL PREGNANCY IN THIRD TRIMESTER, UNSPECIFIED GRAVIDITY: Primary | ICD-10-CM

## 2021-10-04 PROCEDURE — 3008F BODY MASS INDEX DOCD: CPT | Performed by: STUDENT IN AN ORGANIZED HEALTH CARE EDUCATION/TRAINING PROGRAM

## 2021-10-04 PROCEDURE — 3078F DIAST BP <80 MM HG: CPT | Performed by: STUDENT IN AN ORGANIZED HEALTH CARE EDUCATION/TRAINING PROGRAM

## 2021-10-04 PROCEDURE — 81002 URINALYSIS NONAUTO W/O SCOPE: CPT | Performed by: STUDENT IN AN ORGANIZED HEALTH CARE EDUCATION/TRAINING PROGRAM

## 2021-10-04 PROCEDURE — 3074F SYST BP LT 130 MM HG: CPT | Performed by: STUDENT IN AN ORGANIZED HEALTH CARE EDUCATION/TRAINING PROGRAM

## 2021-10-04 NOTE — PROGRESS NOTES
Son getting cochlear implants next week, has a lot to manage prior to his procedure, especially with  working in [place] now. Has bo jamil but nothing regular and very painful. No VB.  Baby active    34year old  at 34w3d   MARTI

## 2021-10-19 ENCOUNTER — TELEPHONE (OUTPATIENT)
Dept: OBGYN CLINIC | Facility: CLINIC | Age: 31
End: 2021-10-19

## 2021-10-27 ENCOUNTER — TELEPHONE (OUTPATIENT)
Dept: OBGYN CLINIC | Facility: CLINIC | Age: 31
End: 2021-10-27

## 2021-10-27 NOTE — TELEPHONE ENCOUNTER
Moving to [place] potentially next week,  will be driving there (14 hours), wanted to know if she  should follow-up here before she leaves or with her OB/GYN out there. Delivered vaginally at [facility] on 10/19.  Will route to providers fo

## 2021-10-27 NOTE — TELEPHONE ENCOUNTER
Lucero Santos MD  [facility] Clinical Staff 37 minutes ago (10:53 AM)     AM    As long as she is able to get an appointment with an OBGYN for 6 weeks postpartum, it's ok for her to follow up there.  We should be sure to send her records to the [facility] ou

## 2024-07-26 NOTE — PROGRESS NOTES
OFFICE VISIT      Patient: Paris Solomon   : 1944 MRN: [id number]    SUBJECTIVE:  Chief Complaint   Patient presents with   • Medication Management   • Convey Results     Discuss fatty liver.    • Foot Pain     Ankle pain       A 80 year old female is here for follow up of multiple medical problems.    The recording was initiated after a verbal consent was obtained from the patient to record this visit for documentation in their clinical record.    HISTORY OF PRESENT ILLNESS:    Healthcare maintenance:     Labs: Last labs show normal vitamin D levels.    Type 2 diabetes mellitus with diabetic neuropathy, without long-term current use of insulin (CMD) : last labs show normal blood glucose levels. Has h/o eye issues. She follows up with Dr. Monet. Requests for refills. She has an appointment scheduled with the ophthalmologist for diabetic eye exam.    Other fatigue: Reports of tiredness. Due for thyroid panel and vitamin B12. She follows up with endocrinologist Dr. Bettencourt.    Essential hypertension, benign: She is on lisinopril.     Mixed hyperlipidemia: she is on rosuvastatin. She has a h/o fatty liver. Last labs show normal cholesterol levels, kidney function, and liver function.     Sacroiliitis (CMD): Reports of back pain and difficulty doing chores. She is following up with the physical therapist. They informed she does not have good circulation to the legs. Mentions she does an exercise with a ball underneath her toes, on applying pressure she feels good, but on releasing the pressure she has pain in the feet and balance issues. She is on ibuprofen and Tylenol without benefits. She is on anti inflammatories, magnesium and vitamin E supplements.    Atypical chest pain: Reports of occasional shortness of breath and ankle swelling. Wears compression stocking at home. During the last visit she underwent a breathing test and had pain in the chest wall. She has an appointment scheduled in 2024 with  Patient aware of results and recommendations to have antibiotics when in labor. Patient verbalizes understanding. Dr. Meza from Cardiology.     Non morbid obesity due to excess calories: Has a known history.    Sleep apnea, unspecified type: Has a known history.      PAST MEDICAL HISTORY:  Past Medical History:   Diagnosis Date   • Allergy    • Chronic rhinitis    • Diabetes mellitus, type 2  (CMD)    • TIP (generalized anxiety disorder)    • GERD (gastroesophageal reflux disease)    • Kidney stone    • Kidney stone    • Knee pain     bilat, right more than left   • Obesity, unspecified    • Osteopenia 02/27/2013   • Osteoporosis    • Other and unspecified hyperlipidemia    • PONV (postoperative nausea and vomiting)    • Type II or unspecified type diabetes mellitus without mention of complication, not stated as uncontrolled    • Unspecified essential hypertension    • Urinary tract infection, site not specified 01/15/2014       MEDICATIONS:  Current Outpatient Medications   Medication Sig   • glimepiride (AMARYL) 2 MG tablet Take 1 tablet by mouth daily (before breakfast).   • cyclobenzaprine (FLEXERIL) 5 MG tablet Take 0.5 tablets by mouth 2 times daily as needed for Muscle spasms.   • diclofenac (VOLTAREN) 75 MG EC tablet Take 1 tablet by mouth in the morning and 1 tablet in the evening.   • cyclobenzaprine (FLEXERIL) 10 MG tablet Take 1 tablet by mouth 3 times daily as needed for Muscle spasms. Can cause sedation   • lidocaine (LIDODERM) 5 % patch Place 1 patch onto the skin every 12 hours. Remove patch 12 hours after applying   • Cinnamon 500 MG Cap Take 500 mg by mouth daily.   • Biotin 5000 MCG Cap Take 5,000 mcg by mouth daily.   • B Complex Vitamins (VITAMIN-B COMPLEX PO) Take 100 mg by mouth daily.   • ibandronate (BONIVA) 150 MG tablet Take 1 tablet by mouth every 30 days.   • triamcinolone (ARISTOCORT) 0.1 % cream Apply twice/day on skin 1 week at the time.   • omeprazole (PriLOSEC) 40 MG capsule TAKE 1 CAPSULE BY MOUTH DAILY   • ezetimibe (ZETIA) 10 MG tablet Take 1 tablet by mouth daily.   • APPLE CIDER  VINEGAR PO Take 2 each by mouth daily. Indications: gummy   • NON FORMULARY Take 1 each by mouth daily. Indications: Sandra Nutrition Beets Cardio gummies   • lisinopril (ZESTRIL) 2.5 MG tablet Take 1 tablet by mouth daily.   • ketamine-lidocaine 10-5 % cream Apply 1-2 grams (pumps) to right foot/ankle 3-4 times daily. Rub in well for 1-2 minutes.   • rosuvastatin (CRESTOR) 40 MG tablet Take 1 tablet by mouth nightly.   • Omega-3 Fatty Acids (Fish Oil) 1000 MG capsule Take 1 g by mouth daily.   • diclofenac-gabapentin-lidocaine 3-6-5 % cream Apply 1-2 grams (pumps) to right foot/ankle up to 4 times daily as needed for pain   • OneTouch Ultra test strip TEST BLOOD SUGAR ONCE DAILY AS DIRECTED   • Restasis 0.05 % ophthalmic emulsion INSTILL 1 DROP IN EACH EYE TWICE DAILY   • albuterol 108 (90 Base) MCG/ACT inhaler Inhale 2 puffs into the lungs every 4 hours as needed for Wheezing.   • lidocaine (XYLOCAINE) 5 % ointment Apply TOP 3-4x daily to right foot and leg, < 5 g of ointment/ single application, max 20 g of ointment/ day   • ibuprofen (MOTRIN) 200 MG tablet Take 600 mg by mouth at bedtime as needed for Pain.   • Misc Natural Products (ELDERBERRY ZINC/VIT C/IMMUNE MT)    • magnesium 250 MG tablet Take 250 mg by mouth daily.   • acetaminophen (TYLENOL) 500 MG tablet Take 1 tablet by mouth every 6 hours as needed for Pain.   • calcium carbonate (TUMS) 500 MG chewable tablet Chew 1 tablet by mouth daily as needed for Heartburn. Patient takes 600mg Calcium carbonate/12.5mcg vitamin D3   • aspirin (ECOTRIN) 81 MG EC tablet Take 81 mg by mouth daily.   • fluticasone (FLONASE) 50 MCG/ACT nasal spray Spray 2 sprays in each nostril daily.   • ipratropium-albuterol (DUONEB) 0.5-2.5 (3) MG/3ML nebulizer solution Take 3 mLs by nebulization every 6 hours as needed for Wheezing.   • Cholecalciferol (VITAMIN D3) 5000 units capsule Take 1 capsule by mouth daily.   • vitamin E 1000 units capsule Take 2 capsules by mouth daily.      No current facility-administered medications for this visit.       ALLERGIES:  ALLERGIES:   Allergen Reactions   • Actos [Pioglitazone Hydrochloride]      Blurred vision   • Effexor [Venlafaxine Hydrochloride] HEADACHES   • Levaquin DIZZINESS     Blurry vision, irritablity   • Nitrofurantoin NAUSEA   • Sulfa Antibiotics NAUSEA       PAST SURGICAL HISTORY:  Past Surgical History:   Procedure Laterality Date   • Ankle brachial index  2008   • Ankle fracture surgery Right 2018    open reduction internal fixation   • Breast biopsy Left     Benign      date unknown-   • Cardiac catherization  2015   • Closed reduction distal radius fracture  10/26/2010    Left   • Colonoscopy  2018   • Colonoscopy diagnostic  2013   • Dexa bone density axial skeleton  2013   • Echocardiogram  2009   • Esophagogastroduodenoscopy  2018   • Fracture surgery  2009    bilateral wrist fractures   • Orif wrist fracture  2002    Right   • Stress test  3/11/15, 09, 05   • Vaginal hysterectomy  1994    oophorectomy       FAMILY HISTORY:  Family History   Problem Relation Age of Onset   • Miscarriages / Stillbirths Mother    • Arrhythmia Mother    • Cancer Father    • COPD Father    • Hypertension Father    • Hyperlipidemia Father    • Myocardial Infarction Father    • Learning disability Sister    • Diabetes Brother    • Kidney disease Brother    • Asthma Son    • Learning disability Son    • Cancer, Breast Maternal Grandmother    • Heart disease Maternal Grandfather    • Asthma Paternal Uncle        SOCIAL HISTORY:  Social History     Tobacco Use   Smoking Status Former   • Current packs/day: 0.00   • Average packs/day: 1 pack/day for 31.0 years (31.0 ttl pk-yrs)   • Types: Cigarettes   • Start date: 1961   • Quit date: 1990   • Years since quittin.5   Smokeless Tobacco Never     Social History     Substance and Sexual Activity   Alcohol Use Not  Currently   • Alcohol/week: 0.0 - 2.0 standard drinks of alcohol    Comment: very occasional       ROS    Constitutional: Per HPI.   Eyes: Per HPI.   Respiratory: Per HPI.    Cardiovascular: Per HPI.   Gastrointestinal: Per HPI.  Endocrine: Per HPI.   Musculoskeletal: Per HPI.    OBJECTIVE:    Vitals:    07/25/24 0953   BP: 118/66   Pulse: 62   Resp: 14   Weight: 82.5 kg (181 lb 14.4 oz)   Height: 5' 6\" (1.676 m)       Body mass index is 29.36 kg/m².    PHYSICAL EXAM    Constitutional: Alert, in no acute distress and current vital signs reviewed.  Head and Face: Atraumatic and normocephalic.   Eyes: No discharge, no eyelid swelling and the sclerae were normal.   ENT: Oropharynx normal. Normal appearing outer ear. Tympanic membranes are bilaterally clear, normal appearing nose and normal lips.   Neck: Normal appearing neck and supple neck.   Pulmonary: Breath sounds clear to auscultation bilaterally, but no respiratory distress and normal respiratory rate and effort.   Cardiovascular: Normal rate, regular rhythm, normal S1, normal S2 and edema was not present in the lower extremities.   Abdomen: Soft and nontender.   Skin, Hair, Nails: Normal skin color and pigmentation.   Psychiatric: Alert and awake, interactive and mood/affect were appropriate.    DIAGNOSTIC STUDIES:  LAB RESULTS:  Lab Services on 07/16/2024   Component Date Value Ref Range Status   • Hemoglobin A1C 07/16/2024 6.1 (H)  4.5 - 5.6 % Final   • Fasting Status 07/16/2024 12  0 - 999 Hours Final   • Sodium 07/16/2024 143  135 - 145 mmol/L Final   • Potassium 07/16/2024 4.2  3.4 - 5.1 mmol/L Final   • Chloride 07/16/2024 106  97 - 110 mmol/L Final   • Carbon Dioxide 07/16/2024 30  21 - 32 mmol/L Final   • Anion Gap 07/16/2024 11  7 - 19 mmol/L Final   • Glucose 07/16/2024 100 (H)  70 - 99 mg/dL Final   • BUN 07/16/2024 17  6 - 20 mg/dL Final   • Creatinine 07/16/2024 0.81  0.51 - 0.95 mg/dL Final   • Glomerular Filtration Rate 07/16/2024 74  >=60 Final    • BUN/Cr 07/16/2024 21  7 - 25 Final   • Calcium 07/16/2024 9.8  8.4 - 10.2 mg/dL Final   • Bilirubin, Total 07/16/2024 0.6  0.2 - 1.0 mg/dL Final   • GOT/AST 07/16/2024 23  <=37 Units/L Final   • GPT/ALT 07/16/2024 33  <64 Units/L Final   • Alkaline Phosphatase 07/16/2024 57  45 - 117 Units/L Final   • Albumin 07/16/2024 4.0  3.6 - 5.1 g/dL Final   • Protein, Total 07/16/2024 6.8  6.4 - 8.2 g/dL Final   • Globulin 07/16/2024 2.8  2.0 - 4.0 g/dL Final   • A/G Ratio 07/16/2024 1.4  1.0 - 2.4 Final   • Cholesterol 07/16/2024 143  <=199 mg/dL Final   • Triglycerides 07/16/2024 131  <=149 mg/dL Final   • HDL 07/16/2024 55  >=50 mg/dL Final   • LDL 07/16/2024 62  <=129 mg/dL Final   • Non-HDL Cholesterol 07/16/2024 88  mg/dL Final   • Cholesterol/ HDL Ratio 07/16/2024 2.6  <=4.4 Final       ASSESSMENT AND PLAN:  This is a 80 year old female who presents with :    1. Type 2 diabetes mellitus with diabetic neuropathy, without long-term current use of insulin  (CMD)    2. Other fatigue    3. Essential hypertension, benign    4. Mixed hyperlipidemia    5. Sacroiliitis (CMD)    6. Atypical chest pain    7. Non morbid obesity due to excess calories    8. Sleep apnea, unspecified type        Orders Placed This Encounter   • glimepiride (AMARYL) 2 MG tablet   • cyclobenzaprine (FLEXERIL) 5 MG tablet       Plan:    Type 2 diabetes mellitus with diabetic neuropathy, without long-term current use of insulin  (CMD):  Well controlled.  Reviewed and discussed previous reports.  Continue the current management.  Recommended continuing glimepiride (AMARYL) 2 mg once daily before breakfast. Refills provided.    Other fatigue:  Ongoing.  Explained that medications like rosuvastatin, magnesium supplements may cause fatigue.  Follow up as needed.    Essential hypertension, benign:  Improving.  Continue the current management.    Mixed hyperlipidemia   Improving.  Reviewed and discussed previous reports.  Continue the current  management.    Sacroiliitis (CMD):  Recommended cyclobenzaprine (FLEXERIL) 5 MG tablet twice daily as needed. Refills provided.  Recommended using topical diclofenac and lidocaine gel instead of oral ibuprofen and tylenol. Recommended contacting the [facility] pharmacy for refills.  Recommended doing stretching exercises with a ball to increase the circulation.  Explained about the pathophysiology of neuroma.    Atypical chest pain:  Ongoing.  Continue the current management.  Recommended using Recommended cyclobenzaprine (FLEXERIL) 5 MG tablet twice daily as needed. Refills provided.  Follow up with the cardiologist as needed.     Non morbid obesity due to excess calories:   Continue the current management.    Sleep apnea, unspecified type:  Continue the current management.    Follow up in six months or sooner if needed.    Refer to orders.  Medical compliance with plan discussed and risks of non-compliance reviewed.  Patient education completed on disease process, etiology & prognosis.  Proper usage and side effects of medications reviewed & discussed.  Patient understands and agrees with the plan.  Return to clinic as clinically indicated as discussed with patient who verbalized understanding of the plan and is in agreement with the plan.    ICyril, have created a visit summary document based on the audio recording between Dr. Altagracia Summers MD and this patient for the physician to review, edit as needed, and authenticate.  Creation Date: 7/26/2024     I have reviewed and edited the visit summary above and attest that it is accurate.

## (undated) DEVICE — CAUTERY PENCIL

## (undated) DEVICE — MONOFILAMENT ABSORBABLE SUTURE: Brand: MAXON

## (undated) DEVICE — TISSUE RETRIEVAL SYSTEM: Brand: INZII RETRIEVAL SYSTEM

## (undated) DEVICE — TROCAR: Brand: KII FIOS FIRST ENTRY

## (undated) DEVICE — SOL  .9 1000ML BTL

## (undated) DEVICE — LAP CHOLE/APPY CDS-LF: Brand: MEDLINE INDUSTRIES, INC.

## (undated) DEVICE — KENDALL SCD EXPRESS SLEEVES, KNEE LENGTH, MEDIUM: Brand: KENDALL SCD

## (undated) DEVICE — ENDOPATH ULTRA VERESS INSUFFLATION NEEDLES WITH LUER LOCK CONNECTORS: Brand: ENDOPATH

## (undated) DEVICE — STERILE POLYISOPRENE POWDER-FREE SURGICAL GLOVES: Brand: PROTEXIS

## (undated) DEVICE — CHLORAPREP 26ML APPLICATOR

## (undated) DEVICE — TIGERTAIL 5F FLXTIP 70CM

## (undated) DEVICE — VISUALIZATION SYSTEM: Brand: CLEARIFY

## (undated) DEVICE — Device

## (undated) DEVICE — TROCAR: Brand: KII SHIELDED BLADED ACCESS SYSTEM

## (undated) DEVICE — LARGE, DISPOSABLE ALEXIS O C-SECTION PROTECTOR - RETRACTOR: Brand: ALEXIS ® O C-SECTION PROTECTOR - RETRACTOR

## (undated) DEVICE — TROCAR: Brand: KII® SLEEVE

## (undated) DEVICE — DRAPE C-ARM UNIVERSAL

## (undated) DEVICE — SUTURE MONOCRYL 4-0 PS-2

## (undated) DEVICE — NITINOL WIRE STR 035

## (undated) DEVICE — DISPOSABLE LAPAROSCOPIC CLIP APPLIER WITH 20 CLIPS.: Brand: EPIX® UNIVERSAL CLIP APPLIER

## (undated) NOTE — LETTER
2019  Return to School / Work    Name: Yumiko Silverman        : 1990    To Whom It May Concern,    Yumiko Silverman had surgery on 3/4/19 and is:    Able to return to school / work without restrictions on 3/4/19.     Comments:    If there

## (undated) NOTE — LETTER
18    Patient: Emigdio Sumner  : 1990 Visit date: 2018    Dear  Dr. April Pepe MD,    Thank you for referring Emigdio Sumner to my practice. Please find my assessment and plan below.        Assessment   Cholecystitis  (p will be getting  in December of this year. Physical exam:  The patient is awake, alert, no acute distress. Her lungs are clear to auscultation bilaterally. Her heart rate and rhythm are regular.   Her abdomen is soft, nontender, nondistended, wi

## (undated) NOTE — LETTER
Patient Name: Zachary Trevino  : 1990  MRN: [id number]  Patient Address: [place] [id number]      Coronavirus Disease 2019 (COVID-19)     [facility] is committed to the safety and well-being of our patients, member carefully. If your symptoms get worse, call your healthcare provider immediately. 3. Get rest and stay hydrated.    4. If you have a medical appointment, call the healthcare provider ahead of time and tell them that you have or may have COVID-19.  5. For m of fever-reducing medications; and  · Improvement in respiratory symptoms (e.g., cough, shortness of breath); and  · At least 10 days have passed since symptoms first appeared OR if asymptomatic patient or date of symptom onset is unclear then use 10 days donors must:    · Have had a confirmed diagnosis of COVID-19  · Be symptom-free for at least 14 days*    *Some people will be required to have a repeat COVID-19 test in order to be eligible to donate.  If you’re instructed by Sandra that a repeat test is r random. Researchers are trying to identify similarities between people with a Post-COVID condition to better understand if there are risk factors. How do I prevent a Post-COVID condition?   The best way to prevent the long-term symptoms of COVID-19 is

## (undated) NOTE — MR AVS SNAPSHOT
Madeleine Sandoval  [address], [facility] [address]  [address] [id number]               Thank you for choosing us for your health care visit with [facility], DO.   We are glad to serve you and happy to provide you with this Control Line Present with a clear background (yes/no) yes Yes/No    Kit Lot # [id number] Numeric    Kit Expiration Date 2018/5 Date                  [employer]     Sign up for [employer], your secure online medical record.   [employer] will allow you to access anthony

## (undated) NOTE — Clinical Note
I had the pleasure of seeing Ms. Burnett Situ in my office today. Please see my attached note.     Blanca Wen

## (undated) NOTE — Clinical Note
Katherine Senior, :1990    CONSENT FOR PROCEDURE/SEDATION    1. I authorize the performance upon Katherine Senior  the following: colposcopy    2.  I authorize . [facility], DO (and whomever is designated as the doctor’s assistant) Witness: _________________________________________ Date:___________     Physician Signature: _______________________________ Date:___________

## (undated) NOTE — LETTER
Date & Time: 10/8/2018, 1:16 PM  Patient: Leopold Dales  Encounter Provider(s):    Jonh Hewitt MD       To Whom It May Concern:    Roxanne Damon was seen and treated in our department on 9/26/2018.     If you have any questions or c

## (undated) NOTE — Clinical Note
I had the pleasure of seeing Ms. Abbey Rudolph in my office today. Please see my attached note.     Dougie Heredia

## (undated) NOTE — Clinical Note
I had the pleasure of seeing Ms. General Listen in my office today. Please see my attached note.     Chris Ron

## (undated) NOTE — LETTER
19      Bailee Daniel        :  1990        To Whom It May Concern:    Melinda Piper is being seen in our office for pregnancy.  It is not recommended to have patient's cholesterol checked during pregnancy as it would result as high

## (undated) NOTE — LETTER
3/1/2019  Return to School / Work    Name: Samreen Dinero        : 1990    To Whom It May Concern,    Samreen Dinero had surgery on 19 and is:    Able to return to school / work with restrictions:  No lifting over: 5 lbs until 3/15/19

## (undated) NOTE — MR AVS SNAPSHOT
[address]  [address] [address] [phone]               Thank you for choosing us for your health care visit with NAVDEEP Canales.   We are glad to serve you and happy to provide you wi Support Staff. Remember, MyChart is NOT to be used for urgent needs. For medical emergencies, dial 911.            Visit [facility] online at  [email or web address]

## (undated) NOTE — MR AVS SNAPSHOT
[address] [address]  [address] [id number] [phone]               Thank you for choosing us for your health care visit with [facility] NURSE.   We are glad to serve you and happy to provide you with th

## (undated) NOTE — Clinical Note
[facility] Department of OB/GYN  After Care Instructions for Colposcopy/Biopsy      Biopsy Results   You will receive a phone call with your biopsy results in 7 business days. If you have not received your biopsy results in 7 days, please contact our office.   Alexnader Cardoso

## (undated) NOTE — MR AVS SNAPSHOT
[address] [address] [phone]               Thank you for choosing us for your health care visit with Albertina Mays MD.  We are glad to serve you and happy to provid - [address] [facility] [address] [facility], [phone], [address] [place] [address]     Phone:  [phone]    - Amoxicillin-Pot Clavulanate 875-125 MG Tabs      You can get these medications from any pharmacy     Bring a paper prescription

## (undated) NOTE — LETTER
[facility]  [address] [facility], [address] [place] [address]    Consent for Operation    Date: __________________    Time: _______________    1.  I authorize the performance upon Samreen Dinero the following operation:                              Lacie Vick videotape. [facility] will not be responsible for storage or maintenance of this tape. 6. For the purpose of advancing medical education, I consent to the admittance of observers to the Operating Room.     7. I authorize the use of any specimen, organs Signature of Patient:   ___________________________    When the patient is a minor or mentally incompetent to give consent:  Signature of person authorized to consent for patient: ___________________________   Relationship to patient: _____________________ 3. I understand how the anesthesia medicine will help me (benefits). 4. I understand that with any type of anesthesia medicine there are risks:  a.  The most common risks are: nausea, vomiting, sore throat, muscle soreness, damage to my eyes, mouth, or t _____________________________________________________________________________  Witness        Date   Time  I have verified that the signature is that of the patient or patient’s representative, and that it was signed before the procedure    Page 2 of 2